# Patient Record
Sex: MALE | Race: WHITE | NOT HISPANIC OR LATINO | URBAN - METROPOLITAN AREA
[De-identification: names, ages, dates, MRNs, and addresses within clinical notes are randomized per-mention and may not be internally consistent; named-entity substitution may affect disease eponyms.]

---

## 2018-12-12 ENCOUNTER — OFFICE (AMBULATORY)
Dept: URBAN - METROPOLITAN AREA PATHOLOGY 4 | Facility: PATHOLOGY | Age: 43
End: 2018-12-12
Payer: COMMERCIAL

## 2018-12-12 ENCOUNTER — ON CAMPUS - OUTPATIENT (AMBULATORY)
Dept: URBAN - METROPOLITAN AREA HOSPITAL 2 | Facility: HOSPITAL | Age: 43
End: 2018-12-12

## 2018-12-12 VITALS
RESPIRATION RATE: 16 BRPM | HEIGHT: 70 IN | DIASTOLIC BLOOD PRESSURE: 78 MMHG | SYSTOLIC BLOOD PRESSURE: 147 MMHG | DIASTOLIC BLOOD PRESSURE: 96 MMHG | HEART RATE: 68 BPM | SYSTOLIC BLOOD PRESSURE: 143 MMHG | DIASTOLIC BLOOD PRESSURE: 84 MMHG | HEART RATE: 67 BPM | OXYGEN SATURATION: 94 % | SYSTOLIC BLOOD PRESSURE: 146 MMHG | SYSTOLIC BLOOD PRESSURE: 132 MMHG | OXYGEN SATURATION: 98 % | TEMPERATURE: 97.5 F | WEIGHT: 246 LBS | DIASTOLIC BLOOD PRESSURE: 104 MMHG | OXYGEN SATURATION: 97 % | OXYGEN SATURATION: 99 % | DIASTOLIC BLOOD PRESSURE: 77 MMHG | HEART RATE: 66 BPM | RESPIRATION RATE: 18 BRPM | OXYGEN SATURATION: 95 % | DIASTOLIC BLOOD PRESSURE: 87 MMHG | HEART RATE: 61 BPM | HEART RATE: 70 BPM | SYSTOLIC BLOOD PRESSURE: 131 MMHG

## 2018-12-12 DIAGNOSIS — K22.2 ESOPHAGEAL OBSTRUCTION: ICD-10-CM

## 2018-12-12 DIAGNOSIS — K29.60 OTHER GASTRITIS WITHOUT BLEEDING: ICD-10-CM

## 2018-12-12 DIAGNOSIS — K29.50 UNSPECIFIED CHRONIC GASTRITIS WITHOUT BLEEDING: ICD-10-CM

## 2018-12-12 DIAGNOSIS — K44.9 DIAPHRAGMATIC HERNIA WITHOUT OBSTRUCTION OR GANGRENE: ICD-10-CM

## 2018-12-12 DIAGNOSIS — R13.10 DYSPHAGIA, UNSPECIFIED: ICD-10-CM

## 2018-12-12 PROBLEM — K31.89 OTHER DISEASES OF STOMACH AND DUODENUM: Status: ACTIVE | Noted: 2018-12-12

## 2018-12-12 PROBLEM — K29.70 GASTRITIS, UNSPECIFIED, WITHOUT BLEEDING: Status: ACTIVE | Noted: 2018-12-12

## 2018-12-12 LAB
GI HISTOLOGY: A. SELECT: (no result)
GI HISTOLOGY: PDF REPORT: (no result)

## 2018-12-12 PROCEDURE — 43450 DILATE ESOPHAGUS 1/MULT PASS: CPT | Performed by: INTERNAL MEDICINE

## 2018-12-12 PROCEDURE — 88305 TISSUE EXAM BY PATHOLOGIST: CPT | Performed by: INTERNAL MEDICINE

## 2018-12-12 PROCEDURE — 43239 EGD BIOPSY SINGLE/MULTIPLE: CPT | Performed by: INTERNAL MEDICINE

## 2022-09-30 ENCOUNTER — OFFICE VISIT (OUTPATIENT)
Dept: CARDIOLOGY | Facility: CLINIC | Age: 47
End: 2022-09-30

## 2022-09-30 VITALS
HEIGHT: 70 IN | DIASTOLIC BLOOD PRESSURE: 92 MMHG | HEART RATE: 65 BPM | BODY MASS INDEX: 37.94 KG/M2 | SYSTOLIC BLOOD PRESSURE: 148 MMHG | WEIGHT: 265 LBS | OXYGEN SATURATION: 96 %

## 2022-09-30 DIAGNOSIS — R06.09 DYSPNEA ON EXERTION: Primary | ICD-10-CM

## 2022-09-30 DIAGNOSIS — R07.9 CHEST PAIN, UNSPECIFIED TYPE: ICD-10-CM

## 2022-09-30 DIAGNOSIS — R00.2 PALPITATIONS: ICD-10-CM

## 2022-09-30 PROCEDURE — 99204 OFFICE O/P NEW MOD 45 MIN: CPT | Performed by: INTERNAL MEDICINE

## 2022-09-30 PROCEDURE — 93000 ELECTROCARDIOGRAM COMPLETE: CPT | Performed by: INTERNAL MEDICINE

## 2022-09-30 RX ORDER — OMEPRAZOLE 20 MG/1
CAPSULE, DELAYED RELEASE ORAL
COMMUNITY
Start: 2016-06-13 | End: 2022-12-21

## 2022-09-30 RX ORDER — IBUPROFEN 800 MG/1
TABLET ORAL
COMMUNITY
Start: 2014-12-10 | End: 2022-09-30

## 2022-10-27 ENCOUNTER — HOSPITAL ENCOUNTER (OUTPATIENT)
Dept: CARDIOLOGY | Facility: HOSPITAL | Age: 47
Discharge: HOME | End: 2022-10-27

## 2022-10-27 VITALS
WEIGHT: 265 LBS | BODY MASS INDEX: 37.94 KG/M2 | SYSTOLIC BLOOD PRESSURE: 142 MMHG | DIASTOLIC BLOOD PRESSURE: 86 MMHG | HEIGHT: 70 IN | HEART RATE: 56 BPM

## 2022-10-27 DIAGNOSIS — R06.09 DYSPNEA ON EXERTION: ICD-10-CM

## 2022-10-27 DIAGNOSIS — R00.2 PALPITATIONS: ICD-10-CM

## 2022-10-27 DIAGNOSIS — R07.9 CHEST PAIN, UNSPECIFIED TYPE: ICD-10-CM

## 2022-10-27 PROCEDURE — 93017 CV STRESS TEST TRACING ONLY: CPT

## 2022-10-27 PROCEDURE — A9500 TC99M SESTAMIBI: HCPCS | Performed by: INTERNAL MEDICINE

## 2022-10-27 PROCEDURE — 0 TECHNETIUM SESTAMIBI: Performed by: INTERNAL MEDICINE

## 2022-10-27 PROCEDURE — 78452 HT MUSCLE IMAGE SPECT MULT: CPT

## 2022-10-27 PROCEDURE — 78452 HT MUSCLE IMAGE SPECT MULT: CPT | Performed by: INTERNAL MEDICINE

## 2022-10-27 PROCEDURE — 93018 CV STRESS TEST I&R ONLY: CPT | Performed by: INTERNAL MEDICINE

## 2022-10-27 PROCEDURE — 93016 CV STRESS TEST SUPVJ ONLY: CPT | Performed by: INTERNAL MEDICINE

## 2022-10-27 PROCEDURE — 93306 TTE W/DOPPLER COMPLETE: CPT

## 2022-10-27 PROCEDURE — 93306 TTE W/DOPPLER COMPLETE: CPT | Performed by: INTERNAL MEDICINE

## 2022-10-27 RX ADMIN — TECHNETIUM TC 99M SESTAMIBI 1 DOSE: 1 INJECTION INTRAVENOUS at 09:18

## 2022-10-27 RX ADMIN — TECHNETIUM TC 99M SESTAMIBI 1 DOSE: 1 INJECTION INTRAVENOUS at 11:16

## 2022-11-05 LAB
AORTIC ARCH: 2.8 CM
ASCENDING AORTA: 3.1 CM
BH CV ECHO MEAS - ACS: 2.5 CM
BH CV ECHO MEAS - AO MAX PG: 7.6 MMHG
BH CV ECHO MEAS - AO MEAN PG: 3.6 MMHG
BH CV ECHO MEAS - AO ROOT DIAM: 3.3 CM
BH CV ECHO MEAS - AO V2 MAX: 137.4 CM/SEC
BH CV ECHO MEAS - AO V2 VTI: 29.4 CM
BH CV ECHO MEAS - AVA(I,D): 3.9 CM2
BH CV ECHO MEAS - EDV(CUBED): 182.2 ML
BH CV ECHO MEAS - EDV(MOD-SP2): 139.1 ML
BH CV ECHO MEAS - EDV(MOD-SP4): 147.4 ML
BH CV ECHO MEAS - EF(MOD-BP): 55 %
BH CV ECHO MEAS - EF(MOD-SP2): 58.9 %
BH CV ECHO MEAS - EF(MOD-SP4): 54.2 %
BH CV ECHO MEAS - ESV(CUBED): 66 ML
BH CV ECHO MEAS - ESV(MOD-SP2): 57.2 ML
BH CV ECHO MEAS - ESV(MOD-SP4): 67.5 ML
BH CV ECHO MEAS - FS: 28.7 %
BH CV ECHO MEAS - IVS/LVPW: 1.21 CM
BH CV ECHO MEAS - IVSD: 1.2 CM
BH CV ECHO MEAS - LA A2CS (ATRIAL LENGTH): 5.8 CM
BH CV ECHO MEAS - LA A4C LENGTH: 5.9 CM
BH CV ECHO MEAS - LA DIMENSION: 4.8 CM
BH CV ECHO MEAS - LAT PEAK E' VEL: 14 CM/SEC
BH CV ECHO MEAS - LV DIASTOLIC VOL/BSA (35-75): 62.6 CM2
BH CV ECHO MEAS - LV MASS(C)D: 253.6 GRAMS
BH CV ECHO MEAS - LV MAX PG: 3.8 MMHG
BH CV ECHO MEAS - LV MEAN PG: 2.09 MMHG
BH CV ECHO MEAS - LV SYSTOLIC VOL/BSA (12-30): 28.7 CM2
BH CV ECHO MEAS - LV V1 MAX: 97.2 CM/SEC
BH CV ECHO MEAS - LV V1 VTI: 23.3 CM
BH CV ECHO MEAS - LVIDD: 5.7 CM
BH CV ECHO MEAS - LVIDS: 4 CM
BH CV ECHO MEAS - LVOT AREA: 4.9 CM2
BH CV ECHO MEAS - LVOT DIAM: 2.5 CM
BH CV ECHO MEAS - LVPWD: 0.99 CM
BH CV ECHO MEAS - MED PEAK E' VEL: 10 CM/SEC
BH CV ECHO MEAS - MR MAX PG: 104.2 MMHG
BH CV ECHO MEAS - MR MAX VEL: 510.3 CM/SEC
BH CV ECHO MEAS - MV A MAX VEL: 46.2 CM/SEC
BH CV ECHO MEAS - MV DEC SLOPE: 514.7 CM/SEC2
BH CV ECHO MEAS - MV DEC TIME: 0.18 MSEC
BH CV ECHO MEAS - MV E MAX VEL: 90.6 CM/SEC
BH CV ECHO MEAS - MV E/A: 1.96
BH CV ECHO MEAS - MV MAX PG: 3.6 MMHG
BH CV ECHO MEAS - MV MEAN PG: 0.57 MMHG
BH CV ECHO MEAS - MV P1/2T: 51 MSEC
BH CV ECHO MEAS - MV V2 VTI: 25.1 CM
BH CV ECHO MEAS - MVA(P1/2T): 4.3 CM2
BH CV ECHO MEAS - MVA(VTI): 4.6 CM2
BH CV ECHO MEAS - PA ACC SLOPE: 467 CM/SEC2
BH CV ECHO MEAS - PA ACC TIME: 0.14 SEC
BH CV ECHO MEAS - PA PR(ACCEL): 16.6 MMHG
BH CV ECHO MEAS - PA V2 MAX: 104.4 CM/SEC
BH CV ECHO MEAS - PAPD(PI EDV): 5 MMHG
BH CV ECHO MEAS - PI END-D VEL: 60.8 CM/SEC
BH CV ECHO MEAS - PULM A REVS DUR: 0.13 SEC
BH CV ECHO MEAS - PULM A REVS VEL: 20.6 CM/SEC
BH CV ECHO MEAS - PULM DIAS VEL: 61.3 CM/SEC
BH CV ECHO MEAS - PULM S/D: 1.09
BH CV ECHO MEAS - PULM SYS VEL: 66.6 CM/SEC
BH CV ECHO MEAS - RAP SYSTOLE: 3 MMHG
BH CV ECHO MEAS - RV MAX PG: 1.77 MMHG
BH CV ECHO MEAS - RV V1 MAX: 66.6 CM/SEC
BH CV ECHO MEAS - RV V1 VTI: 18 CM
BH CV ECHO MEAS - RVSP: 32.3 MMHG
BH CV ECHO MEAS - SI(MOD-SP2): 34.8 ML/M2
BH CV ECHO MEAS - SI(MOD-SP4): 33.9 ML/M2
BH CV ECHO MEAS - SUP REN AO DIAM: 2.5 CM
BH CV ECHO MEAS - SV(LVOT): 114.8 ML
BH CV ECHO MEAS - SV(MOD-SP2): 81.9 ML
BH CV ECHO MEAS - SV(MOD-SP4): 79.8 ML
BH CV ECHO MEAS - TAPSE (>1.6): 1.9 CM
BH CV ECHO MEAS - TR MAX PG: 29.3 MMHG
BH CV ECHO MEAS - TR MAX VEL: 270.6 CM/SEC
BH CV ECHO MEASUREMENTS AVERAGE E/E' RATIO: 7.55
BH CV VAS BP LEFT ARM: NORMAL MMHG
BH CV XLRA - RV BASE: 4 CM
BH CV XLRA - RV MID: 3.7 CM
BH CV XLRA - TDI S': 11 CM/SEC
IVRT: 88 MSEC
LEFT ATRIUM VOLUME INDEX: 37 ML/M2
LEFT ATRIUM VOLUME: 37 ML
MAXIMAL PREDICTED HEART RATE: 171 BPM
STRESS TARGET HR: 145 BPM

## 2022-11-09 LAB
BH CV REST NUCLEAR ISOTOPE DOSE: 11.8 MCI
BH CV STRESS BP STAGE 1: NORMAL
BH CV STRESS BP STAGE 2: NORMAL
BH CV STRESS DURATION MIN STAGE 1: 3
BH CV STRESS DURATION MIN STAGE 2: 3
BH CV STRESS DURATION MIN STAGE 3: 3
BH CV STRESS DURATION MIN STAGE 4: 1
BH CV STRESS DURATION SEC STAGE 1: 0
BH CV STRESS DURATION SEC STAGE 2: 0
BH CV STRESS DURATION SEC STAGE 3: 0
BH CV STRESS DURATION SEC STAGE 4: 30
BH CV STRESS GRADE STAGE 1: 10
BH CV STRESS GRADE STAGE 2: 12
BH CV STRESS GRADE STAGE 3: 14
BH CV STRESS GRADE STAGE 4: 16
BH CV STRESS HR STAGE 1: 93
BH CV STRESS HR STAGE 2: 120
BH CV STRESS HR STAGE 3: 142
BH CV STRESS HR STAGE 4: 162
BH CV STRESS METS STAGE 1: 5
BH CV STRESS METS STAGE 2: 7.5
BH CV STRESS METS STAGE 3: 10
BH CV STRESS METS STAGE 4: 13.5
BH CV STRESS NUCLEAR ISOTOPE DOSE: 34 MCI
BH CV STRESS PROTOCOL 1: NORMAL
BH CV STRESS RECOVERY BP: NORMAL MMHG
BH CV STRESS RECOVERY HR: 93 BPM
BH CV STRESS SPEED STAGE 1: 1.7
BH CV STRESS SPEED STAGE 2: 2.5
BH CV STRESS SPEED STAGE 3: 3.4
BH CV STRESS SPEED STAGE 4: 4.2
BH CV STRESS STAGE 1: 1
BH CV STRESS STAGE 2: 2
BH CV STRESS STAGE 3: 3
BH CV STRESS STAGE 4: 4
LV EF NUC BP: 55 %
MAXIMAL PREDICTED HEART RATE: 171 BPM
PERCENT MAX PREDICTED HR: 95.91 %
STRESS BASELINE BP: NORMAL MMHG
STRESS BASELINE HR: 49 BPM
STRESS PERCENT HR: 113 %
STRESS POST ESTIMATED WORKLOAD: 13.4 METS
STRESS POST EXERCISE DUR MIN: 10 MIN
STRESS POST EXERCISE DUR SEC: 15 SEC
STRESS POST PEAK BP: NORMAL MMHG
STRESS POST PEAK HR: 164 BPM
STRESS TARGET HR: 145 BPM

## 2022-12-21 ENCOUNTER — OFFICE VISIT (OUTPATIENT)
Dept: CARDIOLOGY | Facility: CLINIC | Age: 47
End: 2022-12-21
Payer: COMMERCIAL

## 2022-12-21 VITALS
WEIGHT: 267 LBS | DIASTOLIC BLOOD PRESSURE: 85 MMHG | HEART RATE: 51 BPM | BODY MASS INDEX: 38.22 KG/M2 | HEIGHT: 70 IN | SYSTOLIC BLOOD PRESSURE: 125 MMHG | OXYGEN SATURATION: 97 %

## 2022-12-21 DIAGNOSIS — R00.2 PALPITATIONS: Primary | ICD-10-CM

## 2022-12-21 PROCEDURE — 99213 OFFICE O/P EST LOW 20 MIN: CPT | Performed by: INTERNAL MEDICINE

## 2022-12-21 RX ORDER — OMEPRAZOLE 40 MG/1
40 CAPSULE, DELAYED RELEASE ORAL DAILY
COMMUNITY
Start: 2022-10-13

## 2022-12-21 RX ORDER — METOPROLOL SUCCINATE 25 MG/1
25 TABLET, EXTENDED RELEASE ORAL DAILY
COMMUNITY
Start: 2022-11-19

## 2022-12-21 RX ORDER — MAGNESIUM GLUCONATE 27 MG(500)
27 TABLET ORAL DAILY
COMMUNITY

## 2022-12-21 RX ORDER — MULTIPLE VITAMINS W/ MINERALS TAB 9MG-400MCG
1 TAB ORAL DAILY
COMMUNITY

## 2022-12-21 NOTE — PROGRESS NOTES
Cardiology Office Visit      Encounter Date:  12/21/2022    Patient ID:   Guille Thompson is a 47 y.o. male.    Reason For Followup:  Palpitations  Chest discomfort    Brief Clinical History:  Dear Dr. Hyde, Nicolle Pettit MD    I had the pleasure of seeing Guille Thompson today. As you are well aware, this is a 47 y.o. male  with no established history of ischemic heart disease.  He does have a history of hypertension and acid reflux.  He presents today for the follow-up evaluation of chest discomfort.    Interval History:  He reports that he has had chest discomfort for quite some time.  He has had stress test and EKGs in the long past and was told that his discomfort was musculoskeletal.  He does carry some concern however because he does have a significant family history of heart disease.  His father and uncles all suffer from coronary occlusive disease and cardiac conditions.  His chest discomfort has been persistent for 7 to 8 years.  His last stress test was about 5 years ago at [facility].  The discomfort appears to happen spontaneously.  It can be a 6-7 out of 10 in intensity.  He can have some soreness in his left arm and he can have a headache.  He denies any shortness of breath.  He denies any PND orthopnea.  He denies any syncope or near syncope.  He does report palpitations.  There does not appear to be a relationship between the palpitations and chest discomfort.    He is currently in the process of a work-up with a rheumatologist for her musculoskeletal issues and arthritis.  There was some concern that he may have had lupus however it appears that his test were inaccurate and subsequent testing has failed to demonstrate any abnormalities.    We were able to locate his most recent stress echocardiogram from August 2015.  Isolated PACs and PVCs were noted with stress.  Hyperdynamic wall motion was noted in all segments and post stress EF exceeded 70%.  No ST segment depression was noted.  A hypertensive  response to exercise was noted.    Since the patient's last visit.,  He has acquired a cardia mobile device and has been checking his rhythm.  He reports that he is had no further palpitations.  No abnormalities of been reported on his cardia mobile device.  We reviewed some of the rhythms today.    The patient underwent a nuclear stress test and 2D echocardiogram in November 2022.  Nuclear stress test was negative for ischemia and demonstrative of a normal left ventricular systolic ejection fraction of 55%.  2D echocardiogram demonstrated normal left ventricular systolic function and EF of 55 to 60%.    Assessment & Plan    Impressions:  Chest pain with typical and atypical features  Palpitations  Hypertension  Musculoskeletal issues and potential arthritis  Sleep apnea  Acid reflux    Recommendations:  Continuation of his current cardiovascular regimen at the present time.     This includes metoprolol  Follow-up as needed      Diagnoses and all orders for this visit:    1. Palpitations (Primary)          Objective:    Vitals:  Vitals:    12/21/22 0921   BP: 125/85   Pulse: 51   SpO2: 97%   Weight: 121 kg (267 lb)   Height: 177.8 cm (70\")     Body mass index is 38.31 kg/m².      Physical Exam:    General: Alert, cooperative, no distress, appears stated age  Head:  Normocephalic, atraumatic, mucous membranes moist  Eyes:  Conjunctiva/corneas clear, EOM's intact     Neck:  Supple,  no bruit    Lungs: Clear to auscultation bilaterally, no wheezes rhonchi rales are noted  Chest wall: No tenderness  Heart::  Regular rate and rhythm, S1 and S2 normal, 1/6 holosystolic murmur.  No rub or gallop  Abdomen: Soft, non-tender, nondistended bowel sounds active  Extremities: No cyanosis, clubbing, or edema  Pulses: 2+ and symmetric all extremities  Skin:  No rashes or lesions  Neuro/psych: A&O x3. CN II through XII are grossly intact with appropriate affect      Allergies:  Allergies   Allergen Reactions   • Penicillins Rash        Medication Review:     Current Outpatient Medications:   •  magnesium gluconate (MAGONATE) 500 MG tablet, Take 27 mg by mouth Daily., Disp: , Rfl:   •  metoprolol succinate XL (TOPROL-XL) 25 MG 24 hr tablet, Take 25 mg by mouth Daily., Disp: , Rfl:   •  multivitamin with minerals tablet tablet, Take 1 tablet by mouth Daily., Disp: , Rfl:   •  omeprazole (priLOSEC) 40 MG capsule, Take 40 mg by mouth Daily., Disp: , Rfl:   •  vitamin D3 125 MCG (5000 UT) capsule capsule, Take 5,000 Units by mouth 2 (Two) Times a Day., Disp: , Rfl:     Family History:  Family History   Problem Relation Age of Onset   • Heart disease Father        Past Medical History:  Past Medical History:   Diagnosis Date   • Hypertension    • Sleep apnea        Past Surgical History:  Past Surgical History:   Procedure Laterality Date   • APPENDECTOMY     • LASIK         Social History:  Social History     Socioeconomic History   • Marital status:    Tobacco Use   • Smoking status: Never   • Smokeless tobacco: Never   Vaping Use   • Vaping Use: Never used   Substance and Sexual Activity   • Alcohol use: Yes     Comment: social   • Drug use: Never       Review of Systems:  The following systems were reviewed as they relate to the cardiovascular system: Constitutional, Eyes, ENT, Cardiovascular, Respiratory, Gastrointestinal, Integumentary, Neurological, Psychiatric, Hematologic, Endocrine, Musculoskeletal, and Genitourinary. The pertinent cardiovascular findings are reported above with all other cardiovascular points within those systems being negative.    Diagnostic Study Review:     Current Electrocardiogram:  Procedures no new EKG.  EKG dated 9/30/2022 demonstrates sinus rhythm with a ventricular rate of 58 bpm.    Laboratory Data:  No results found for: GLUCOSE, BUN, CREATININE, EGFRIFNONA, EGFRIFAFRI, BCR, K, CO2, CALCIUM, PROTENTOTREF, ALBUMIN, LABIL2, BILIRUBIN, AST, ALT  No results found for: GLUCOSE, CALCIUM, NA, K, CO2, CL,  BUN, CREATININE, EGFRIFAFRI, EGFRIFNONA, BCR, ANIONGAP  No results found for: WBC, HGB, HCT, MCV, PLT  No results found for: CHOL, CHLPL, TRIG, HDL, LDL, LDLDIRECT  No results found for: HGBA1C  No results found for: INR, PROTIME    Most Recent Echo:  Results for orders placed during the hospital encounter of 10/27/22    Adult Transthoracic Echo Complete W/ Cont if Necessary Per Protocol    Interpretation Summary  •  Left ventricular systolic function is normal. Left ventricular ejection fraction appears to be 56 - 60%.  •  The left ventricular cavity is borderline dilated.  •  Estimated right ventricular systolic pressure from tricuspid regurgitation is normal (<35 mmHg).       Most Recent Stress Test:  Results for orders placed during the hospital encounter of 10/27/22    Stress Test With Myocardial Perfusion One Day    Interpretation Summary  •  Myocardial perfusion imaging indicates a normal myocardial perfusion study with no evidence of ischemia.  •  Left ventricular ejection fraction is normal (Calculated EF = 55%).  •  Low risk for ischemic heart disease.  •  Impressions are consistent with a low risk study.  •  There is no prior study available for comparison.  •  Findings consistent with a normal ECG stress test.       Most Recent Cardiac Catheterization:   No results found for this or any previous visit.       NOTE: The following portions of the patient's note were reviewed, confirmed and/or updated this visit as appropriate: History of present illness/Interval history, physical examination, assessment & plan, allergies, current medications, past family history, past medical history, past social history, past surgical history and problem list.

## 2023-01-09 PROBLEM — R00.2 PALPITATIONS: Status: ACTIVE | Noted: 2023-01-09

## 2024-12-10 ENCOUNTER — OFFICE (AMBULATORY)
Dept: URBAN - METROPOLITAN AREA CLINIC 64 | Facility: CLINIC | Age: 49
End: 2024-12-10

## 2024-12-10 VITALS
DIASTOLIC BLOOD PRESSURE: 98 MMHG | HEART RATE: 64 BPM | WEIGHT: 283 LBS | HEIGHT: 70 IN | SYSTOLIC BLOOD PRESSURE: 143 MMHG

## 2024-12-10 DIAGNOSIS — R19.4 CHANGE IN BOWEL HABIT: ICD-10-CM

## 2024-12-10 DIAGNOSIS — K21.9 GASTRO-ESOPHAGEAL REFLUX DISEASE WITHOUT ESOPHAGITIS: ICD-10-CM

## 2024-12-10 PROCEDURE — 99204 OFFICE O/P NEW MOD 45 MIN: CPT | Performed by: INTERNAL MEDICINE

## 2024-12-15 ENCOUNTER — APPOINTMENT (OUTPATIENT)
Dept: CT IMAGING | Facility: HOSPITAL | Age: 49
End: 2024-12-15
Payer: COMMERCIAL

## 2024-12-15 ENCOUNTER — HOSPITAL ENCOUNTER (INPATIENT)
Facility: HOSPITAL | Age: 49
LOS: 3 days | Discharge: HOME | End: 2024-12-18
Attending: INTERNAL MEDICINE | Admitting: INTERNAL MEDICINE
Payer: COMMERCIAL

## 2024-12-15 DIAGNOSIS — R11.0 NAUSEA: ICD-10-CM

## 2024-12-15 DIAGNOSIS — K85.90 ACUTE PANCREATITIS WITHOUT INFECTION OR NECROSIS, UNSPECIFIED PANCREATITIS TYPE: ICD-10-CM

## 2024-12-15 DIAGNOSIS — K85.30 DRUG-INDUCED ACUTE PANCREATITIS WITHOUT INFECTION OR NECROSIS: ICD-10-CM

## 2024-12-15 DIAGNOSIS — R10.13 EPIGASTRIC ABDOMINAL PAIN: Primary | ICD-10-CM

## 2024-12-15 LAB
ALBUMIN SERPL-MCNC: 3.9 G/DL (ref 3.5–5.2)
ALBUMIN/GLOB SERPL: 1.3 G/DL
ALP SERPL-CCNC: 73 U/L (ref 39–117)
ALT SERPL W P-5'-P-CCNC: 16 U/L (ref 1–41)
ANION GAP SERPL CALCULATED.3IONS-SCNC: 10.2 MMOL/L (ref 5–15)
AST SERPL-CCNC: 24 U/L (ref 1–40)
BASOPHILS # BLD AUTO: 0.04 10*3/MM3 (ref 0–0.2)
BASOPHILS NFR BLD AUTO: 0.3 % (ref 0–1.5)
BILIRUB SERPL-MCNC: 0.6 MG/DL (ref 0–1.2)
BILIRUB UR QL STRIP: NEGATIVE
BUN SERPL-MCNC: 9 MG/DL (ref 6–20)
BUN/CREAT SERPL: 9.6 (ref 7–25)
CALCIUM SPEC-SCNC: 9.2 MG/DL (ref 8.6–10.5)
CHLORIDE SERPL-SCNC: 103 MMOL/L (ref 98–107)
CLARITY UR: CLEAR
CO2 SERPL-SCNC: 24.8 MMOL/L (ref 22–29)
COLOR UR: YELLOW
CREAT SERPL-MCNC: 0.94 MG/DL (ref 0.76–1.27)
DEPRECATED RDW RBC AUTO: 44.2 FL (ref 37–54)
EGFRCR SERPLBLD CKD-EPI 2021: 98.1 ML/MIN/1.73
EOSINOPHIL # BLD AUTO: 0.08 10*3/MM3 (ref 0–0.4)
EOSINOPHIL NFR BLD AUTO: 0.7 % (ref 0.3–6.2)
ERYTHROCYTE [DISTWIDTH] IN BLOOD BY AUTOMATED COUNT: 13.2 % (ref 12.3–15.4)
GLOBULIN UR ELPH-MCNC: 3.1 GM/DL
GLUCOSE SERPL-MCNC: 106 MG/DL (ref 65–99)
GLUCOSE UR STRIP-MCNC: NEGATIVE MG/DL
HCT VFR BLD AUTO: 45.9 % (ref 37.5–51)
HGB BLD-MCNC: 14.4 G/DL (ref 13–17.7)
HGB UR QL STRIP.AUTO: NEGATIVE
HOLD SPECIMEN: NORMAL
IMM GRANULOCYTES # BLD AUTO: 0.03 10*3/MM3 (ref 0–0.05)
IMM GRANULOCYTES NFR BLD AUTO: 0.3 % (ref 0–0.5)
KETONES UR QL STRIP: NEGATIVE
LEUKOCYTE ESTERASE UR QL STRIP.AUTO: NEGATIVE
LIPASE SERPL-CCNC: 83 U/L (ref 13–60)
LYMPHOCYTES # BLD AUTO: 1.47 10*3/MM3 (ref 0.7–3.1)
LYMPHOCYTES NFR BLD AUTO: 12.3 % (ref 19.6–45.3)
MCH RBC QN AUTO: 28.3 PG (ref 26.6–33)
MCHC RBC AUTO-ENTMCNC: 31.4 G/DL (ref 31.5–35.7)
MCV RBC AUTO: 90.4 FL (ref 79–97)
MONOCYTES # BLD AUTO: 1.1 10*3/MM3 (ref 0.1–0.9)
MONOCYTES NFR BLD AUTO: 9.2 % (ref 5–12)
NEUTROPHILS NFR BLD AUTO: 77.2 % (ref 42.7–76)
NEUTROPHILS NFR BLD AUTO: 9.21 10*3/MM3 (ref 1.7–7)
NITRITE UR QL STRIP: NEGATIVE
NRBC BLD AUTO-RTO: 0 /100 WBC (ref 0–0.2)
PH UR STRIP.AUTO: 6.5 [PH] (ref 5–8)
PLATELET # BLD AUTO: 200 10*3/MM3 (ref 140–450)
PMV BLD AUTO: 10.3 FL (ref 6–12)
POTASSIUM SERPL-SCNC: 3.9 MMOL/L (ref 3.5–5.2)
PROT SERPL-MCNC: 7 G/DL (ref 6–8.5)
PROT UR QL STRIP: NEGATIVE
RBC # BLD AUTO: 5.08 10*6/MM3 (ref 4.14–5.8)
SODIUM SERPL-SCNC: 138 MMOL/L (ref 136–145)
SP GR UR STRIP: 1.02 (ref 1–1.03)
UROBILINOGEN UR QL STRIP: NORMAL
WBC NRBC COR # BLD AUTO: 11.93 10*3/MM3 (ref 3.4–10.8)

## 2024-12-15 PROCEDURE — 85025 COMPLETE CBC W/AUTO DIFF WBC: CPT

## 2024-12-15 PROCEDURE — 25810000003 SODIUM CHLORIDE 0.9 % SOLUTION

## 2024-12-15 PROCEDURE — 81003 URINALYSIS AUTO W/O SCOPE: CPT

## 2024-12-15 PROCEDURE — 25510000001 IOPAMIDOL PER 1 ML

## 2024-12-15 PROCEDURE — 99285 EMERGENCY DEPT VISIT HI MDM: CPT

## 2024-12-15 PROCEDURE — 74177 CT ABD & PELVIS W/CONTRAST: CPT

## 2024-12-15 PROCEDURE — 25010000002 ONDANSETRON PER 1 MG

## 2024-12-15 PROCEDURE — 80053 COMPREHEN METABOLIC PANEL: CPT

## 2024-12-15 PROCEDURE — 25010000002 PROCHLORPERAZINE 10 MG/2ML SOLUTION

## 2024-12-15 PROCEDURE — 25010000002 KETOROLAC TROMETHAMINE PER 15 MG: Performed by: FAMILY MEDICINE

## 2024-12-15 PROCEDURE — 63710000001 ONDANSETRON ODT 4 MG TABLET DISPERSIBLE

## 2024-12-15 PROCEDURE — 93010 ELECTROCARDIOGRAM REPORT: CPT | Performed by: INTERNAL MEDICINE

## 2024-12-15 PROCEDURE — 93005 ELECTROCARDIOGRAM TRACING: CPT

## 2024-12-15 PROCEDURE — 25010000002 ENOXAPARIN PER 10 MG

## 2024-12-15 PROCEDURE — 25010000002 HYDROMORPHONE PER 4 MG

## 2024-12-15 PROCEDURE — 83690 ASSAY OF LIPASE: CPT

## 2024-12-15 PROCEDURE — 25010000002 KETOROLAC TROMETHAMINE PER 15 MG

## 2024-12-15 PROCEDURE — 36415 COLL VENOUS BLD VENIPUNCTURE: CPT

## 2024-12-15 RX ORDER — ENOXAPARIN SODIUM 100 MG/ML
40 INJECTION SUBCUTANEOUS EVERY 12 HOURS
Status: DISCONTINUED | OUTPATIENT
Start: 2024-12-15 | End: 2024-12-18 | Stop reason: HOSPADM

## 2024-12-15 RX ORDER — KETOROLAC TROMETHAMINE 30 MG/ML
15 INJECTION, SOLUTION INTRAMUSCULAR; INTRAVENOUS ONCE
Status: COMPLETED | OUTPATIENT
Start: 2024-12-15 | End: 2024-12-15

## 2024-12-15 RX ORDER — HYDRALAZINE HYDROCHLORIDE 20 MG/ML
10 INJECTION INTRAMUSCULAR; INTRAVENOUS EVERY 6 HOURS PRN
Status: DISCONTINUED | OUTPATIENT
Start: 2024-12-15 | End: 2024-12-18 | Stop reason: HOSPADM

## 2024-12-15 RX ORDER — BISACODYL 5 MG/1
5 TABLET, DELAYED RELEASE ORAL DAILY PRN
Status: DISCONTINUED | OUTPATIENT
Start: 2024-12-15 | End: 2024-12-18 | Stop reason: HOSPADM

## 2024-12-15 RX ORDER — ONDANSETRON 4 MG/1
4 TABLET, ORALLY DISINTEGRATING ORAL ONCE
Status: COMPLETED | OUTPATIENT
Start: 2024-12-15 | End: 2024-12-15

## 2024-12-15 RX ORDER — IOPAMIDOL 755 MG/ML
100 INJECTION, SOLUTION INTRAVASCULAR
Status: COMPLETED | OUTPATIENT
Start: 2024-12-15 | End: 2024-12-15

## 2024-12-15 RX ORDER — DICYCLOMINE HYDROCHLORIDE 10 MG/1
20 CAPSULE ORAL ONCE
Status: COMPLETED | OUTPATIENT
Start: 2024-12-15 | End: 2024-12-15

## 2024-12-15 RX ORDER — ONDANSETRON 4 MG/1
4 TABLET, ORALLY DISINTEGRATING ORAL EVERY 6 HOURS PRN
Status: DISCONTINUED | OUTPATIENT
Start: 2024-12-15 | End: 2024-12-18 | Stop reason: HOSPADM

## 2024-12-15 RX ORDER — SODIUM CHLORIDE 0.9 % (FLUSH) 0.9 %
10 SYRINGE (ML) INJECTION EVERY 12 HOURS SCHEDULED
Status: DISCONTINUED | OUTPATIENT
Start: 2024-12-15 | End: 2024-12-18 | Stop reason: HOSPADM

## 2024-12-15 RX ORDER — NITROGLYCERIN 0.4 MG/1
0.4 TABLET SUBLINGUAL
Status: DISCONTINUED | OUTPATIENT
Start: 2024-12-15 | End: 2024-12-18 | Stop reason: HOSPADM

## 2024-12-15 RX ORDER — SODIUM CHLORIDE 9 MG/ML
125 INJECTION, SOLUTION INTRAVENOUS CONTINUOUS
Status: DISCONTINUED | OUTPATIENT
Start: 2024-12-15 | End: 2024-12-16

## 2024-12-15 RX ORDER — HYDRALAZINE HYDROCHLORIDE 20 MG/ML
20 INJECTION INTRAMUSCULAR; INTRAVENOUS EVERY 6 HOURS PRN
Status: DISCONTINUED | OUTPATIENT
Start: 2024-12-15 | End: 2024-12-15

## 2024-12-15 RX ORDER — BISACODYL 10 MG
10 SUPPOSITORY, RECTAL RECTAL DAILY PRN
Status: DISCONTINUED | OUTPATIENT
Start: 2024-12-15 | End: 2024-12-18 | Stop reason: HOSPADM

## 2024-12-15 RX ORDER — ONDANSETRON 2 MG/ML
4 INJECTION INTRAMUSCULAR; INTRAVENOUS EVERY 6 HOURS PRN
Status: DISCONTINUED | OUTPATIENT
Start: 2024-12-15 | End: 2024-12-18 | Stop reason: HOSPADM

## 2024-12-15 RX ORDER — PANTOPRAZOLE SODIUM 40 MG/10ML
40 INJECTION, POWDER, LYOPHILIZED, FOR SOLUTION INTRAVENOUS DAILY
Status: DISCONTINUED | OUTPATIENT
Start: 2024-12-15 | End: 2024-12-18 | Stop reason: HOSPADM

## 2024-12-15 RX ORDER — SODIUM CHLORIDE 0.9 % (FLUSH) 0.9 %
10 SYRINGE (ML) INJECTION AS NEEDED
Status: DISCONTINUED | OUTPATIENT
Start: 2024-12-15 | End: 2024-12-18 | Stop reason: HOSPADM

## 2024-12-15 RX ORDER — POLYETHYLENE GLYCOL 3350 17 G/17G
17 POWDER, FOR SOLUTION ORAL DAILY PRN
Status: DISCONTINUED | OUTPATIENT
Start: 2024-12-15 | End: 2024-12-18 | Stop reason: HOSPADM

## 2024-12-15 RX ORDER — HYDROMORPHONE HYDROCHLORIDE 1 MG/ML
0.5 INJECTION, SOLUTION INTRAMUSCULAR; INTRAVENOUS; SUBCUTANEOUS
Status: DISCONTINUED | OUTPATIENT
Start: 2024-12-15 | End: 2024-12-16

## 2024-12-15 RX ORDER — PROCHLORPERAZINE EDISYLATE 5 MG/ML
10 INJECTION INTRAMUSCULAR; INTRAVENOUS ONCE
Status: COMPLETED | OUTPATIENT
Start: 2024-12-15 | End: 2024-12-15

## 2024-12-15 RX ORDER — AMOXICILLIN 250 MG
2 CAPSULE ORAL 2 TIMES DAILY PRN
Status: DISCONTINUED | OUTPATIENT
Start: 2024-12-15 | End: 2024-12-18 | Stop reason: HOSPADM

## 2024-12-15 RX ORDER — ENOXAPARIN SODIUM 100 MG/ML
40 INJECTION SUBCUTANEOUS NIGHTLY
Status: DISCONTINUED | OUTPATIENT
Start: 2024-12-15 | End: 2024-12-15

## 2024-12-15 RX ORDER — SODIUM CHLORIDE 9 MG/ML
40 INJECTION, SOLUTION INTRAVENOUS AS NEEDED
Status: DISCONTINUED | OUTPATIENT
Start: 2024-12-15 | End: 2024-12-18 | Stop reason: HOSPADM

## 2024-12-15 RX ADMIN — ONDANSETRON 4 MG: 2 INJECTION INTRAMUSCULAR; INTRAVENOUS at 16:37

## 2024-12-15 RX ADMIN — ENOXAPARIN SODIUM 40 MG: 100 INJECTION SUBCUTANEOUS at 20:46

## 2024-12-15 RX ADMIN — PROCHLORPERAZINE EDISYLATE 10 MG: 5 INJECTION INTRAMUSCULAR; INTRAVENOUS at 11:46

## 2024-12-15 RX ADMIN — HYDROMORPHONE HYDROCHLORIDE 0.5 MG: 1 INJECTION, SOLUTION INTRAMUSCULAR; INTRAVENOUS; SUBCUTANEOUS at 16:38

## 2024-12-15 RX ADMIN — KETOROLAC TROMETHAMINE 15 MG: 30 INJECTION, SOLUTION INTRAMUSCULAR; INTRAVENOUS at 21:33

## 2024-12-15 RX ADMIN — Medication 10 ML: at 20:47

## 2024-12-15 RX ADMIN — IOPAMIDOL 100 ML: 755 INJECTION, SOLUTION INTRAVENOUS at 11:32

## 2024-12-15 RX ADMIN — DICYCLOMINE HYDROCHLORIDE 20 MG: 10 CAPSULE ORAL at 10:10

## 2024-12-15 RX ADMIN — ONDANSETRON 4 MG: 4 TABLET, ORALLY DISINTEGRATING ORAL at 10:10

## 2024-12-15 RX ADMIN — Medication 10 ML: at 15:17

## 2024-12-15 RX ADMIN — HYDROMORPHONE HYDROCHLORIDE 0.5 MG: 1 INJECTION, SOLUTION INTRAMUSCULAR; INTRAVENOUS; SUBCUTANEOUS at 21:33

## 2024-12-15 RX ADMIN — HYDROMORPHONE HYDROCHLORIDE 0.5 MG: 1 INJECTION, SOLUTION INTRAMUSCULAR; INTRAVENOUS; SUBCUTANEOUS at 19:23

## 2024-12-15 RX ADMIN — KETOROLAC TROMETHAMINE 15 MG: 30 INJECTION, SOLUTION INTRAMUSCULAR; INTRAVENOUS at 11:46

## 2024-12-15 RX ADMIN — PANTOPRAZOLE SODIUM 40 MG: 40 INJECTION, POWDER, FOR SOLUTION INTRAVENOUS at 15:17

## 2024-12-15 RX ADMIN — SODIUM CHLORIDE 125 ML/HR: 9 INJECTION, SOLUTION INTRAVENOUS at 15:17

## 2024-12-15 NOTE — H&P
"[facility] Medicine Services  History & Physical    Patient Name: Guille Thompson  : 1975  MRN: [id number]  Primary Care Physician:  Nicolel Hyde MD  Date of admission: 12/15/2024  Date and Time of Service: 12/15/2024 at 1200    Subjective      Chief Complaint: Abdominal pain    History of Present Illness: Guille Thompson is a 49 y.o. male with a CMH of hypertension, sleep apnea not on CPAP, obesity who presented to [facility] on 12/15/2024 with abdominal pain for the past 2 days.  Patient reported he had left flank pain earlier in the week that resolved.  Patient reported 2 days ago he started to have epigastric pain, that progressively worsened with radiation throughout abdomen.  Patient reported initially he thought he may have some mild food poisoning or a kidney stone, but decided to come in for evaluation with worsening and persistent pain.  Patient reported symptoms started after having dinner at a restaurant 3 nights ago and reported he over ate and had bloating and burping.  Patient reported he has not vomited.  Patient reported nausea is currently controlled after receiving IV Zofran.  Patient denied shortness of breath, chest pain, fever, chills, and any other acute issue.    In the ED: pertinent labs include lipase 83, , WBC 11.93. CT showed \"Inflammation in the region of the pancreatic head or duodenal C-loop most concerning for early pancreatitis\". Patient received dicyclomine 20 mg, Toradol 15 mg, Zofran 4 mg, Compazine 10 mg. Hospitalist team to admit for further management.        Review of Systems   Constitutional:  Negative for chills and fever.   Respiratory:  Negative for chest tightness and shortness of breath.    Cardiovascular:  Negative for chest pain and leg swelling.   Gastrointestinal:  Positive for abdominal pain. Negative for constipation, diarrhea, nausea and vomiting.   Genitourinary:  Negative for dysuria and hematuria.       Personal " History     Past Medical History:   Diagnosis Date    Hypertension     Sleep apnea        Past Surgical History:   Procedure Laterality Date    APPENDECTOMY      TUNDE         Family History: family history includes Heart disease in his father. Otherwise pertinent FHx was reviewed and not pertinent to current issue.    Social History:  reports that he has never smoked. He has never used smokeless tobacco. He reports current alcohol use. He reports that he does not use drugs.    Home Medications:  Prior to Admission Medications       Prescriptions Last Dose Informant Patient Reported? Taking?    magnesium gluconate (MAGONATE) 500 MG tablet  Self Yes No    Take 27 mg by mouth Daily.    metoprolol succinate XL (TOPROL-XL) 25 MG 24 hr tablet  Self Yes No    Take 25 mg by mouth Daily.    multivitamin with minerals tablet tablet  Self Yes No    Take 1 tablet by mouth Daily.    omeprazole (priLOSEC) 40 MG capsule  Self Yes No    Take 40 mg by mouth Daily.    vitamin D3 125 MCG (5000 UT) capsule capsule  Self Yes No    Take 5,000 Units by mouth 2 (Two) Times a Day.              Allergies:  Allergies   Allergen Reactions    Penicillins Rash       Objective      Vitals:   Temp:  [97.6 °F (36.4 °C)] 97.6 °F (36.4 °C)  Heart Rate:  [54-71] 64  Resp:  [15-20] 15  BP: (140-171)/() 157/89  Body mass index is 40.05 kg/m².  Physical Exam  Constitutional:       Appearance: He is obese.   HENT:      Head: Normocephalic and atraumatic.      Nose: Nose normal.      Mouth/Throat:      Mouth: Mucous membranes are moist.      Pharynx: Oropharynx is clear.   Eyes:      Extraocular Movements: Extraocular movements intact.      Conjunctiva/sclera: Conjunctivae normal.      Pupils: Pupils are equal, round, and reactive to light.   Cardiovascular:      Rate and Rhythm: Regular rhythm. Bradycardia present.      Pulses: Normal pulses.      Heart sounds: Normal heart sounds.   Pulmonary:      Effort: Pulmonary effort is normal.      Breath  sounds: Normal breath sounds.   Abdominal:      General: Bowel sounds are normal.      Palpations: Abdomen is soft.      Tenderness: There is abdominal tenderness.   Musculoskeletal:         General: Normal range of motion.      Cervical back: Neck supple.   Skin:     General: Skin is warm and dry.   Neurological:      General: No focal deficit present.      Mental Status: He is alert and oriented to person, place, and time.   Psychiatric:         Mood and Affect: Mood normal.         Behavior: Behavior normal.         Thought Content: Thought content normal.         Judgment: Judgment normal.         Diagnostic Data:  Lab Results (last 24 hours)       Procedure Component Value Units Date/Time    Extra Tubes [[id number]] Collected: 12/15/24 1016    Specimen: Blood, Venous Line Updated: 12/15/24 1101    Narrative:      The following orders were created for panel order Extra Tubes.  Procedure                               Abnormality         Status                     ---------                               -----------         ------                     Gold Top - SST[[id number]]                                   Final result                 Please view results for these tests on the individual orders.    Gold Top - SST [[id number]] Collected: 12/15/24 1016    Specimen: Blood Updated: 12/15/24 1101     Extra Tube Hold for add-ons.     Comment: Auto resulted.       Comprehensive Metabolic Panel [[id number]]  (Abnormal) Collected: 12/15/24 1016    Specimen: Blood Updated: 12/15/24 1046     Glucose 106 mg/dL      BUN 9 mg/dL      Creatinine 0.94 mg/dL      Sodium 138 mmol/L      Potassium 3.9 mmol/L      Chloride 103 mmol/L      CO2 24.8 mmol/L      Calcium 9.2 mg/dL      Total Protein 7.0 g/dL      Albumin 3.9 g/dL      ALT (SGPT) 16 U/L      AST (SGOT) 24 U/L      Alkaline Phosphatase 73 U/L      Total Bilirubin 0.6 mg/dL      Globulin 3.1 gm/dL      A/G Ratio 1.3 g/dL      BUN/Creatinine Ratio 9.6     Anion Gap 10.2  mmol/L      eGFR 98.1 mL/min/1.73     Narrative:      GFR Categories in Chronic Kidney Disease (CKD)      GFR Category          GFR (mL/min/1.73)    Interpretation  G1                     90 or greater         Normal or high (1)  G2                      60-89                Mild decrease (1)  G3a                   45-59                Mild to moderate decrease  G3b                   30-44                Moderate to severe decrease  G4                    15-29                Severe decrease  G5                    14 or less           Kidney failure          (1)In the absence of evidence of kidney disease, neither GFR category G1 or G2 fulfill the criteria for CKD.    eGFR calculation 2021 CKD-EPI creatinine equation, which does not include race as a factor    Lipase [[id number]]  (Abnormal) Collected: 12/15/24 1016    Specimen: Blood Updated: 12/15/24 1046     Lipase 83 U/L     Urinalysis With Culture If Indicated - Urine, Clean Catch [[id number]]  (Normal) Collected: 12/15/24 1024    Specimen: Urine, Clean Catch Updated: 12/15/24 1035     Color, UA Yellow     Appearance, UA Clear     pH, UA 6.5     Specific Gravity, UA 1.019     Glucose, UA Negative     Ketones, UA Negative     Bilirubin, UA Negative     Blood, UA Negative     Protein, UA Negative     Leuk Esterase, UA Negative     Nitrite, UA Negative     Urobilinogen, UA 0.2 E.U./dL    Narrative:      In absence of clinical symptoms, the presence of pyuria, bacteria, and/or nitrites on the urinalysis result does not correlate with infection.  Urine microscopic not indicated.    CBC & Differential [[id number]]  (Abnormal) Collected: 12/15/24 1016    Specimen: Blood Updated: 12/15/24 1027    Narrative:      The following orders were created for panel order CBC & Differential.  Procedure                               Abnormality         Status                     ---------                               -----------         ------                     CBC Auto  Differential[[id number]]        Abnormal            Final result                 Please view results for these tests on the individual orders.    CBC Auto Differential [[id number]]  (Abnormal) Collected: 12/15/24 1016    Specimen: Blood Updated: 12/15/24 1027     WBC 11.93 10*3/mm3      RBC 5.08 10*6/mm3      Hemoglobin 14.4 g/dL      Hematocrit 45.9 %      MCV 90.4 fL      MCH 28.3 pg      MCHC 31.4 g/dL      RDW 13.2 %      RDW-SD 44.2 fl      MPV 10.3 fL      Platelets 200 10*3/mm3      Neutrophil % 77.2 %      Lymphocyte % 12.3 %      Monocyte % 9.2 %      Eosinophil % 0.7 %      Basophil % 0.3 %      Immature Grans % 0.3 %      Neutrophils, Absolute 9.21 10*3/mm3      Lymphocytes, Absolute 1.47 10*3/mm3      Monocytes, Absolute 1.10 10*3/mm3      Eosinophils, Absolute 0.08 10*3/mm3      Basophils, Absolute 0.04 10*3/mm3      Immature Grans, Absolute 0.03 10*3/mm3      nRBC 0.0 /100 WBC              Imaging Results (Last 24 Hours)       Procedure Component Value Units Date/Time    CT Abdomen Pelvis With Contrast [[id number]] Collected: 12/15/24 1134     Updated: 12/15/24 1141    Narrative:      CT ABDOMEN PELVIS W CONTRAST    Date of Exam: 12/15/2024 11:25 AM EST    Indication: Epigastric abdominal pain.    Comparison: None available.    Technique: Axial CT images were obtained of the abdomen and pelvis following the uneventful intravenous administration of iodinated contrast. Sagittal and coronal reconstructions were performed.  Automated exposure control and iterative reconstruction   methods were used.    FINDINGS:    Lung bases: No masses. No consolidation.    Liver:No masses. No intrahepatic biliary ductal dilatation.    Spleen:No masses. No perisplenic hematoma.    Pancreas: Inflammation in the region of the pancreatic head or duodenal C-loop    Gallbladder and common bile duct:No evidence of cholelithiasis. No evidence of cholecystitis.    Adrenal glands:No adrenal masses    Kidneys and ureters:No kidney  stones. No renal masses.No calculi present within the ureters. Normal caliber ureters.    Urinary bladder:No urinary bladder wall thickening. No bladder masses.    Small bowel:Normal caliber small bowel.    Large bowel:No diverticulosis or diverticulitis. No large bowel masses are appreciated    Appendix: Prior appendectomy    GENITOURINARY: Normal prostate    Ascites or pneumoperitoneum:None.    Adenopathy:None present    Osseous structures: The pubic bones are intact. The proximal femurs are intact. The hip joints are maintained. Degenerative changes of the lumbar spine.    Other findings: None      Impression:      Inflammation in the region of the pancreatic head or duodenal C-loop most concerning for early pancreatitis          Electronically Signed: Doug Gross MD    12/15/2024 11:39 AM EST    Workstation ID: [id number]              Assessment & Plan        This is a 49 y.o. male with:    Active and Resolved Problems  Active Hospital Problems    Diagnosis  POA    **Acute pancreatitis [K85.90]  Yes      Resolved Hospital Problems   No resolved problems to display.       Abdominal pain  Acute pancreatitis  - Possibly secondary to diet/triglycerides  - CT abd consistent with pancreatitis, see report above  - SIRS: does not meet SIRS criteria  - Lipase: 83  - WBC 11.93  - AM lipid panel ordered  - Fluid: NS @ 125 mL/hr  - Pain control: dilaudid 0.5 mg Q2H PRN  - Antinausea: zofran 4 mg Q6H PRN  - GI prophylaxis: protonix 40 IV daily  - VTE prophylaxis: enoxaparin 40 mg daily  - Nutrition: NPO while on IV pain medication, having N/V  - Continuous pulse ox, cardiac monitor    Hypertension  - Start hydralazine 10 mg IV PRN SBP >160, DBP >100 while NPO  - Continue home medications once off strict NPO  - Monitor BP per order    Asymptomatic bradycardia  - HR 50s, now in 60s  - EKG ordered  - Continuous cardiac monitor      VTE Prophylaxis:  Pharmacologic & mechanical VTE prophylaxis orders are present.        The  patient desires to be as follows:    CODE STATUS:    Level Of Support Discussed With: Patient  Code Status (Patient has no pulse and is not breathing): CPR (Attempt to Resuscitate)  Medical Interventions (Patient has pulse or is breathing): Full Support        Estrella Thompson, spouse, who can be contacted at [phone], is the designated person to make medical decisions on the patient's behalf if He is incapable of doing so. This was clarified with patient and/or next of kin on 12/15/2024 during the course of this H&P.    Admission Status:  I believe this patient meets inpatient status.    Expected Length of Stay: 1-2 days    PDMP and Medication Dispenses via Sidebar reviewed and consistent with patient reported medications.    I discussed the patient's findings and my recommendations with patient.      Signature:     This document has been electronically signed by GURVINDER Burger on December 15, 2024 15:28 [facility] Hospitalist Team

## 2024-12-15 NOTE — ED PROVIDER NOTES
Subjective   History of Present Illness  Patient is a 49-year-old male presenting to the ED for abdominal pain worsening over the past 2 days.  Patient states he went out of town last week, states when he came back he was having left-sided flank pain radiating into his groin and slight pain with urination, he does have a history of right-sided kidney stones.  States this pain has resolved.  2 days ago he went out to dinner, felt like he over ate and throughout the night felt bloated and had increased burping.  He tried using Tums and Pepto-Bismol with only slight improvement in symptoms.  He said last night and this morning he woke up with increasing pain in his get the gastric area that is a constant 5 out of 10 with intermittent radiation throughout his abdomen rating it an 8 out of 10.  He reports intermittent nausea and chills.  Denies any vomiting, fever, chest pain, dyspnea, dysuria, hematuria.  He reports a small bowel movement this morning, states he has been having trouble with irregular bowel movements.  Saw Dr. Christie GI doctor on Tuesday who plans to do an EGD and colonoscopy in a month.        Review of Systems   Constitutional:  Positive for chills. Negative for fever.   Respiratory:  Negative for shortness of breath.    Cardiovascular:  Negative for chest pain.   Gastrointestinal:  Positive for abdominal distention, abdominal pain and nausea. Negative for vomiting.   Genitourinary:  Negative for dysuria and hematuria.       Past Medical History:   Diagnosis Date    Hypertension     Sleep apnea        Allergies   Allergen Reactions    Penicillins Rash       Past Surgical History:   Procedure Laterality Date    APPENDECTOMY      TUNDE         Family History   Problem Relation Age of Onset    Heart disease Father        Social History     Socioeconomic History    Marital status:    Tobacco Use    Smoking status: Never    Smokeless tobacco: Never   Vaping Use    Vaping status: Never Used  "  Substance and Sexual Activity    Alcohol use: Yes     Comment: social    Drug use: Never           Objective   Physical Exam  Constitutional:       Appearance: He is well-developed.   HENT:      Head: Normocephalic and atraumatic.   Eyes:      Extraocular Movements: Extraocular movements intact.   Cardiovascular:      Rate and Rhythm: Normal rate and regular rhythm.      Heart sounds: Normal heart sounds.   Pulmonary:      Effort: Pulmonary effort is normal.      Breath sounds: Normal breath sounds.   Abdominal:      General: Bowel sounds are normal. There is distension.      Palpations: Abdomen is soft.      Tenderness: There is generalized abdominal tenderness and tenderness in the epigastric area.   Musculoskeletal:         General: No tenderness. Normal range of motion.      Cervical back: Normal range of motion.      Right lower leg: No edema.      Left lower leg: No edema.   Skin:     General: Skin is warm and dry.      Capillary Refill: Capillary refill takes less than 2 seconds.   Neurological:      General: No focal deficit present.      Mental Status: He is alert and oriented to person, place, and time.   Psychiatric:         Mood and Affect: Mood normal.         Behavior: Behavior normal.         Procedures           ED Course  ED Course as of 12/15/24 1225   Sun Dec 15, 2024   1222 Spoke with Krystal MANZANARES with hospitalist group who agrees admit patient. [EC]      ED Course User Index  [EC] Sowmya Soriano PA-C      /94   Pulse 54   Temp 97.6 °F (36.4 °C) (Oral)   Resp 15   Ht 177.8 cm (70\")   Wt 127 kg (279 lb 1.6 oz)   SpO2 97%   BMI 40.05 kg/m²   Labs Reviewed   COMPREHENSIVE METABOLIC PANEL - Abnormal; Notable for the following components:       Result Value    Glucose 106 (*)     All other components within normal limits    Narrative:     GFR Categories in Chronic Kidney Disease (CKD)      GFR Category          GFR (mL/min/1.73)    Interpretation  G1                     90 or greater      "    Normal or high (1)  G2                      60-89                Mild decrease (1)  G3a                   45-59                Mild to moderate decrease  G3b                   30-44                Moderate to severe decrease  G4                    15-29                Severe decrease  G5                    14 or less           Kidney failure          (1)In the absence of evidence of kidney disease, neither GFR category G1 or G2 fulfill the criteria for CKD.    eGFR calculation 2021 CKD-EPI creatinine equation, which does not include race as a factor   LIPASE - Abnormal; Notable for the following components:    Lipase 83 (*)     All other components within normal limits   CBC WITH AUTO DIFFERENTIAL - Abnormal; Notable for the following components:    WBC 11.93 (*)     MCHC 31.4 (*)     Neutrophil % 77.2 (*)     Lymphocyte % 12.3 (*)     Neutrophils, Absolute 9.21 (*)     Monocytes, Absolute 1.10 (*)     All other components within normal limits   URINALYSIS W/ CULTURE IF INDICATED - Normal    Narrative:     In absence of clinical symptoms, the presence of pyuria, bacteria, and/or nitrites on the urinalysis result does not correlate with infection.  Urine microscopic not indicated.   CBC AND DIFFERENTIAL    Narrative:     The following orders were created for panel order CBC & Differential.  Procedure                               Abnormality         Status                     ---------                               -----------         ------                     CBC Auto Differential[[id number]]        Abnormal            Final result                 Please view results for these tests on the individual orders.   EXTRA TUBES    Narrative:     The following orders were created for panel order Extra Tubes.  Procedure                               Abnormality         Status                     ---------                               -----------         ------                     Gold Top - [facility][[id number]]                                    Final result                 Please view results for these tests on the individual orders.   GOLD TOP - [facility]     Medications   ondansetron ODT (ZOFRAN-ODT) disintegrating tablet 4 mg (4 mg Oral Given 12/15/24 1010)   dicyclomine (BENTYL) capsule 20 mg (20 mg Oral Given 12/15/24 1010)   ketorolac (TORADOL) injection 15 mg (15 mg Intravenous Given 12/15/24 1146)   prochlorperazine (COMPAZINE) injection 10 mg (10 mg Intravenous Given 12/15/24 1146)   iopamidol (ISOVUE-370) 76 % injection 100 mL (100 mL Intravenous Given 12/15/24 1132)     CT Abdomen Pelvis With Contrast   Final Result   Inflammation in the region of the pancreatic head or duodenal C-loop most concerning for early pancreatitis               Electronically Signed: Doug Gross MD     12/15/2024 11:39 AM EST     Workstation ID: [id number]                                                           Medical Decision Making  Patient presented to the ED for the above complaint.    Patient underwent the above exam and evaluation.    While in the ED patient was placed in a gown and IV was established.  CT abdomen pelvis and blood work was obtained to assess for pancreatitis, cholecystitis, obstruction, electrolyte abnormality, dehydration.  Patient was given IV pain medicine and Zofran for symptoms.  Upon reevaluation patient is resting comfortably, reporting continuing pain.  Discussed findings with patient.  Educated him that we will be admitting him to the hospital due to early signs of pancreatitis on the CT and making him n.p.o.  Spoke with Krystal MANZANARES with hospitalist group who agreed to admit patient.  Patient voiced understanding, agreeable with dispo plan.    Labs were independently interpreted by myself and deemed remarkable for the following: WBC 11.93, glucose 106, lipase 83, urinalysis negative for infection.  CT abdomen and pelvis independently interpreted by radiologist and reviewed by myself showing: Inflammation in region of  pancreatic head or duodenal C-loop concerning for early pancreatitis.    Appropriate PPE was worn during each patient encounter.    Based on clinical findings anticipate this patient will require 2 midnight stay.      Problems Addressed:  Acute pancreatitis without infection or necrosis, unspecified pancreatitis type: complicated acute illness or injury  Epigastric abdominal pain: complicated acute illness or injury  Nausea: complicated acute illness or injury    Amount and/or Complexity of Data Reviewed  Labs: ordered.  Radiology: ordered.    Risk  Prescription drug management.        Final diagnoses:   Epigastric abdominal pain   Nausea   Acute pancreatitis without infection or necrosis, unspecified pancreatitis type       ED Disposition  ED Disposition       ED Disposition   Decision to Admit    Condition   --    Comment   Level of Care: Telemetry [5]   Diagnosis: Acute pancreatitis [577.0.ICD-9-CM]   Admitting Physician: BARRERA POWELL [[id number]]   Certification: I Certify That Inpatient Hospital Services Are Medically Necessary For Greater Than 2 Midnights                 No follow-up provider specified.       Medication List      No changes were made to your prescriptions during this visit.            Sowmya Soriano PA-C  12/15/24 1306

## 2024-12-15 NOTE — ED NOTES
Nursing report ED to floor  Guille Thompson  49 y.o.  male    HPI:   Chief Complaint   Patient presents with    Abdominal Pain     Abd pain all over,  pain started 2 days ago,  pt states he overate on Friday and that flared it up.  Pt has had some soft stools.         Admitting doctor:   Arabella Villagran MD    Admitting diagnosis:   The primary encounter diagnosis was Epigastric abdominal pain. Diagnoses of Nausea and Acute pancreatitis without infection or necrosis, unspecified pancreatitis type were also pertinent to this visit.    Code status:   Current Code Status       Date Active Code Status Order ID Comments User Context       12/15/2024 1528 CPR (Attempt to Resuscitate) [id number]  Krystal Gilbert APRN ED        Question Answer    Code Status (Patient has no pulse and is not breathing) CPR (Attempt to Resuscitate)    Medical Interventions (Patient has pulse or is breathing) Full Support    Level Of Support Discussed With Patient                    Allergies:   Penicillins    Isolation:  No active isolations     Fall Risk:  Fall Risk Assessment was completed, and patient is at low risk for falls.   Predictive Model Details         5 (Low) Factor Value    Calculated 12/15/2024 17:11 Age 49    Risk of Fall Model Active Peripheral IV Present     Imaging order in this encounter Present     Number of Distinct Medication Classes administered 7     Magnesium not on file     Kash Scale not on file     Number of administrations of Anti-Rheumatics 1     Number of administrations of Ulcer Drugs 2     Diastolic BP 79     Clinically Relevant Sex Not Female     Albumin 3.9 g/dL     Cardiac Assessment X     Number of administrations of Analgesic Narcotics 1     Total Bilirubin 0.6 mg/dL     Calcium 9.2 mg/dL     Gastrointestinal Assessment X     Days after Admission 0.32     Chloride 103 mmol/L     Creatinine 0.94 mg/dL     Potassium 3.9 mmol/L     Respiratory Rate 15     ALT 16 U/L         Weight:       12/15/24  0991    Weight: 127 kg (279 lb 1.6 oz)       Intake and Output  No intake or output data in the 24 hours ending 12/15/24 1715    Diet:   Dietary Orders (From admission, onward)       Start     Ordered    12/15/24 1146  NPO Diet NPO Type: Strict NPO  Diet Effective Now        Question:  NPO Type  Answer:  Strict NPO    12/15/24 1145                     Most recent vitals:   Vitals:    12/15/24 1603 12/15/24 1639 12/15/24 1650 12/15/24 1702   BP: (!) 159/104 (!) 188/101 (!) 178/107 149/79   BP Location:       Patient Position:       Pulse: 62 66 67 60   Resp:       Temp:       TempSrc:       SpO2: 98% 100% 98% 94%   Weight:       Height:           Active LDAs/IV Access:   Lines, Drains & Airways       Active LDAs       Name Placement date Placement time Site Days    Peripheral IV 12/15/24 1108 Left Antecubital 12/15/24  1108  Antecubital  less than 1                    Skin Condition:   Skin Assessments (last day)       Date/Time Skin WDL    12/15/24 09:34:47 WDL             Labs (abnormal labs have a star):   Labs Reviewed   COMPREHENSIVE METABOLIC PANEL - Abnormal; Notable for the following components:       Result Value    Glucose 106 (*)     All other components within normal limits    Narrative:     GFR Categories in Chronic Kidney Disease (CKD)      GFR Category          GFR (mL/min/1.73)    Interpretation  G1                     90 or greater         Normal or high (1)  G2                      60-89                Mild decrease (1)  G3a                   45-59                Mild to moderate decrease  G3b                   30-44                Moderate to severe decrease  G4                    15-29                Severe decrease  G5                    14 or less           Kidney failure          (1)In the absence of evidence of kidney disease, neither GFR category G1 or G2 fulfill the criteria for CKD.    eGFR calculation 2021 CKD-EPI creatinine equation, which does not include race as a factor   LIPASE - Abnormal;  Notable for the following components:    Lipase 83 (*)     All other components within normal limits   CBC WITH AUTO DIFFERENTIAL - Abnormal; Notable for the following components:    WBC 11.93 (*)     MCHC 31.4 (*)     Neutrophil % 77.2 (*)     Lymphocyte % 12.3 (*)     Neutrophils, Absolute 9.21 (*)     Monocytes, Absolute 1.10 (*)     All other components within normal limits   URINALYSIS W/ CULTURE IF INDICATED - Normal    Narrative:     In absence of clinical symptoms, the presence of pyuria, bacteria, and/or nitrites on the urinalysis result does not correlate with infection.  Urine microscopic not indicated.   CBC AND DIFFERENTIAL    Narrative:     The following orders were created for panel order CBC & Differential.  Procedure                               Abnormality         Status                     ---------                               -----------         ------                     CBC Auto Differential[[id number]]        Abnormal            Final result                 Please view results for these tests on the individual orders.   EXTRA TUBES    Narrative:     The following orders were created for panel order Extra Tubes.  Procedure                               Abnormality         Status                     ---------                               -----------         ------                     Gold Top - [facility][[id number]]                                   Final result                 Please view results for these tests on the individual orders.   GOLD TOP - [facility]       LOC: Person, Place, Time, and Situation    Telemetry:  Telemetry    Cardiac Monitoring Ordered: yes      EKG:   ECG 12 Lead Bradycardia   Preliminary Result   HEART RATE=55  bpm   RR Vqjwywbz=5250  ms   CA Ljgthwjz=924  ms   P Horizontal Axis=42  deg   P Front Axis=37  deg   QRSD Interval=94  ms   QT Bdgciixr=645  ms   HCoA=457  ms   QRS Axis=1  deg   T Wave Axis=1  deg   - OTHERWISE NORMAL ECG -   Sinus bradycardia   Date and Time of  Study:2024-12-15 16:46:33          Medications Given in the ED:   Medications   sodium chloride 0.9 % flush 10 mL (10 mL Intravenous Given 12/15/24 1517)   sodium chloride 0.9 % flush 10 mL (has no administration in time range)   sodium chloride 0.9 % infusion 40 mL (has no administration in time range)   nitroglycerin (NITROSTAT) SL tablet 0.4 mg (has no administration in time range)   Potassium Replacement - Follow Nurse / BPA Driven Protocol (has no administration in time range)   Magnesium Standard Dose Replacement - Follow Nurse / BPA Driven Protocol (has no administration in time range)   Phosphorus Replacement - Follow Nurse / BPA Driven Protocol (has no administration in time range)   Calcium Replacement - Follow Nurse / BPA Driven Protocol (has no administration in time range)   sennosides-docusate (PERICOLACE) 8.6-50 MG per tablet 2 tablet (has no administration in time range)     And   polyethylene glycol (MIRALAX) packet 17 g (has no administration in time range)     And   bisacodyl (DULCOLAX) EC tablet 5 mg (has no administration in time range)     And   bisacodyl (DULCOLAX) suppository 10 mg (has no administration in time range)   ondansetron ODT (ZOFRAN-ODT) disintegrating tablet 4 mg ( Oral Not Given:  See Alt 12/15/24 1637)     Or   ondansetron (ZOFRAN) injection 4 mg (4 mg Intravenous Given 12/15/24 1637)   pantoprazole (PROTONIX) injection 40 mg (40 mg Intravenous Given 12/15/24 1517)   sodium chloride 0.9 % infusion (125 mL/hr Intravenous New Bag 12/15/24 1517)   HYDROmorphone (DILAUDID) injection 0.5 mg (0.5 mg Intravenous Given 12/15/24 1638)   Enoxaparin Sodium (LOVENOX) syringe 40 mg (has no administration in time range)   hydrALAZINE (APRESOLINE) injection 10 mg (has no administration in time range)   ondansetron ODT (ZOFRAN-ODT) disintegrating tablet 4 mg (4 mg Oral Given 12/15/24 1010)   dicyclomine (BENTYL) capsule 20 mg (20 mg Oral Given 12/15/24 1010)   ketorolac (TORADOL) injection 15  mg (15 mg Intravenous Given 12/15/24 1146)   prochlorperazine (COMPAZINE) injection 10 mg (10 mg Intravenous Given 12/15/24 1146)   iopamidol (ISOVUE-370) 76 % injection 100 mL (100 mL Intravenous Given 12/15/24 1132)       Imaging results:  CT Abdomen Pelvis With Contrast    Result Date: 12/15/2024  Inflammation in the region of the pancreatic head or duodenal C-loop most concerning for early pancreatitis Electronically Signed: Doug Gross MD  12/15/2024 11:39 AM EST  Workstation ID: [id number]     Social issues:   Social History     Socioeconomic History    Marital status:    Tobacco Use    Smoking status: Never    Smokeless tobacco: Never   Vaping Use    Vaping status: Never Used   Substance and Sexual Activity    Alcohol use: Yes     Comment: social    Drug use: Never       NIH Stroke Scale:  Interval: (not recorded)  1a. Level of Consciousness: (not recorded)  1b. LOC Questions: (not recorded)  1c. LOC Commands: (not recorded)  2. Best Gaze: (not recorded)  3. Visual: (not recorded)  4. Facial Palsy: (not recorded)  5a. Motor Arm, Left: (not recorded)  5b. Motor Arm, Right: (not recorded)  6a. Motor Leg, Left: (not recorded)  6b. Motor Leg, Right: (not recorded)  7. Limb Ataxia: (not recorded)  8. Sensory: (not recorded)  9. Best Language: (not recorded)  10. Dysarthria: (not recorded)  11. Extinction and Inattention (formerly Neglect): (not recorded)    Total (NIH Stroke Scale): (not recorded)     Additional notable assessment information:     Nursing report ED to floor:  BARBARA Navarro RN   12/15/24 17:15 EST

## 2024-12-16 LAB
AMPHET+METHAMPHET UR QL: NEGATIVE
AMPHETAMINES UR QL: NEGATIVE
ANION GAP SERPL CALCULATED.3IONS-SCNC: 11 MMOL/L (ref 5–15)
BARBITURATES UR QL SCN: NEGATIVE
BASOPHILS # BLD AUTO: 0.04 10*3/MM3 (ref 0–0.2)
BASOPHILS NFR BLD AUTO: 0.2 % (ref 0–1.5)
BENZODIAZ UR QL SCN: NEGATIVE
BUN SERPL-MCNC: 7 MG/DL (ref 6–20)
BUN/CREAT SERPL: 7.4 (ref 7–25)
BUPRENORPHINE SERPL-MCNC: NEGATIVE NG/ML
CALCIUM SPEC-SCNC: 9.3 MG/DL (ref 8.6–10.5)
CANNABINOIDS SERPL QL: NEGATIVE
CHLORIDE SERPL-SCNC: 102 MMOL/L (ref 98–107)
CHOLEST SERPL-MCNC: 142 MG/DL (ref 0–200)
CO2 SERPL-SCNC: 23 MMOL/L (ref 22–29)
COCAINE UR QL: NEGATIVE
CREAT SERPL-MCNC: 0.95 MG/DL (ref 0.76–1.27)
DEPRECATED RDW RBC AUTO: 43.8 FL (ref 37–54)
EGFRCR SERPLBLD CKD-EPI 2021: 96.9 ML/MIN/1.73
EOSINOPHIL # BLD AUTO: 0.1 10*3/MM3 (ref 0–0.4)
EOSINOPHIL NFR BLD AUTO: 0.6 % (ref 0.3–6.2)
ERYTHROCYTE [DISTWIDTH] IN BLOOD BY AUTOMATED COUNT: 13.3 % (ref 12.3–15.4)
GLUCOSE SERPL-MCNC: 105 MG/DL (ref 65–99)
HCT VFR BLD AUTO: 45.9 % (ref 37.5–51)
HDLC SERPL-MCNC: 58 MG/DL (ref 40–60)
HGB BLD-MCNC: 14.6 G/DL (ref 13–17.7)
IMM GRANULOCYTES # BLD AUTO: 0.07 10*3/MM3 (ref 0–0.05)
IMM GRANULOCYTES NFR BLD AUTO: 0.4 % (ref 0–0.5)
LDLC SERPL CALC-MCNC: 74 MG/DL (ref 0–100)
LDLC/HDLC SERPL: 1.29 {RATIO}
LYMPHOCYTES # BLD AUTO: 1.19 10*3/MM3 (ref 0.7–3.1)
LYMPHOCYTES NFR BLD AUTO: 6.8 % (ref 19.6–45.3)
MAGNESIUM SERPL-MCNC: 1.9 MG/DL (ref 1.6–2.6)
MCH RBC QN AUTO: 28.6 PG (ref 26.6–33)
MCHC RBC AUTO-ENTMCNC: 31.8 G/DL (ref 31.5–35.7)
MCV RBC AUTO: 89.8 FL (ref 79–97)
METHADONE UR QL SCN: NEGATIVE
MONOCYTES # BLD AUTO: 2.21 10*3/MM3 (ref 0.1–0.9)
MONOCYTES NFR BLD AUTO: 12.7 % (ref 5–12)
NEUTROPHILS NFR BLD AUTO: 13.83 10*3/MM3 (ref 1.7–7)
NEUTROPHILS NFR BLD AUTO: 79.3 % (ref 42.7–76)
NRBC BLD AUTO-RTO: 0 /100 WBC (ref 0–0.2)
OPIATES UR QL: POSITIVE
OXYCODONE UR QL SCN: POSITIVE
PCP UR QL SCN: NEGATIVE
PHOSPHATE SERPL-MCNC: 2.9 MG/DL (ref 2.5–4.5)
PLATELET # BLD AUTO: 193 10*3/MM3 (ref 140–450)
PMV BLD AUTO: 10.5 FL (ref 6–12)
POTASSIUM SERPL-SCNC: 3.7 MMOL/L (ref 3.5–5.2)
QT INTERVAL: 405 MS
QTC INTERVAL: 387 MS
RBC # BLD AUTO: 5.11 10*6/MM3 (ref 4.14–5.8)
SODIUM SERPL-SCNC: 136 MMOL/L (ref 136–145)
TRICYCLICS UR QL SCN: NEGATIVE
TRIGL SERPL-MCNC: 46 MG/DL (ref 0–150)
VLDLC SERPL-MCNC: 10 MG/DL (ref 5–40)
WBC NRBC COR # BLD AUTO: 17.44 10*3/MM3 (ref 3.4–10.8)

## 2024-12-16 PROCEDURE — 80306 DRUG TEST PRSMV INSTRMNT: CPT | Performed by: HOSPITALIST

## 2024-12-16 PROCEDURE — 25810000003 LACTATED RINGERS PER 1000 ML: Performed by: HOSPITALIST

## 2024-12-16 PROCEDURE — 80048 BASIC METABOLIC PNL TOTAL CA: CPT

## 2024-12-16 PROCEDURE — 25010000002 HYDROMORPHONE 1 MG/ML SOLUTION: Performed by: HOSPITALIST

## 2024-12-16 PROCEDURE — 85025 COMPLETE CBC W/AUTO DIFF WBC: CPT

## 2024-12-16 PROCEDURE — 25010000002 ENOXAPARIN PER 10 MG

## 2024-12-16 PROCEDURE — 84100 ASSAY OF PHOSPHORUS: CPT

## 2024-12-16 PROCEDURE — 83735 ASSAY OF MAGNESIUM: CPT

## 2024-12-16 PROCEDURE — 25010000002 ONDANSETRON PER 1 MG

## 2024-12-16 PROCEDURE — 25010000002 HYDROMORPHONE PER 4 MG

## 2024-12-16 PROCEDURE — 80061 LIPID PANEL: CPT

## 2024-12-16 RX ORDER — OXYCODONE HYDROCHLORIDE 5 MG/1
5 TABLET ORAL EVERY 4 HOURS PRN
Status: DISCONTINUED | OUTPATIENT
Start: 2024-12-16 | End: 2024-12-18 | Stop reason: HOSPADM

## 2024-12-16 RX ORDER — SODIUM CHLORIDE, SODIUM LACTATE, POTASSIUM CHLORIDE, CALCIUM CHLORIDE 600; 310; 30; 20 MG/100ML; MG/100ML; MG/100ML; MG/100ML
150 INJECTION, SOLUTION INTRAVENOUS CONTINUOUS
Status: DISPENSED | OUTPATIENT
Start: 2024-12-16 | End: 2024-12-18

## 2024-12-16 RX ORDER — METOPROLOL SUCCINATE 25 MG/1
25 TABLET, EXTENDED RELEASE ORAL DAILY
Status: DISCONTINUED | OUTPATIENT
Start: 2024-12-16 | End: 2024-12-18 | Stop reason: HOSPADM

## 2024-12-16 RX ADMIN — HYDROMORPHONE HYDROCHLORIDE 0.5 MG: 1 INJECTION, SOLUTION INTRAMUSCULAR; INTRAVENOUS; SUBCUTANEOUS at 07:43

## 2024-12-16 RX ADMIN — OXYCODONE 5 MG: 5 TABLET ORAL at 15:17

## 2024-12-16 RX ADMIN — HYDROMORPHONE HYDROCHLORIDE 1 MG: 1 INJECTION, SOLUTION INTRAMUSCULAR; INTRAVENOUS; SUBCUTANEOUS at 11:04

## 2024-12-16 RX ADMIN — OXYCODONE 5 MG: 5 TABLET ORAL at 19:33

## 2024-12-16 RX ADMIN — HYDROMORPHONE HYDROCHLORIDE 1 MG: 1 INJECTION, SOLUTION INTRAMUSCULAR; INTRAVENOUS; SUBCUTANEOUS at 16:35

## 2024-12-16 RX ADMIN — BISACODYL 5 MG: 5 TABLET, COATED ORAL at 15:17

## 2024-12-16 RX ADMIN — PANTOPRAZOLE SODIUM 40 MG: 40 INJECTION, POWDER, FOR SOLUTION INTRAVENOUS at 07:43

## 2024-12-16 RX ADMIN — Medication 10 ML: at 09:28

## 2024-12-16 RX ADMIN — ENOXAPARIN SODIUM 40 MG: 100 INJECTION SUBCUTANEOUS at 21:35

## 2024-12-16 RX ADMIN — ONDANSETRON 4 MG: 2 INJECTION INTRAMUSCULAR; INTRAVENOUS at 02:12

## 2024-12-16 RX ADMIN — HYDROMORPHONE HYDROCHLORIDE 0.5 MG: 1 INJECTION, SOLUTION INTRAMUSCULAR; INTRAVENOUS; SUBCUTANEOUS at 02:12

## 2024-12-16 RX ADMIN — SODIUM CHLORIDE, SODIUM LACTATE, POTASSIUM CHLORIDE, CALCIUM CHLORIDE 150 ML/HR: 20; 30; 600; 310 INJECTION, SOLUTION INTRAVENOUS at 19:34

## 2024-12-16 RX ADMIN — SODIUM CHLORIDE, SODIUM LACTATE, POTASSIUM CHLORIDE, CALCIUM CHLORIDE 150 ML/HR: 20; 30; 600; 310 INJECTION, SOLUTION INTRAVENOUS at 10:03

## 2024-12-16 RX ADMIN — Medication 10 ML: at 21:36

## 2024-12-16 RX ADMIN — ENOXAPARIN SODIUM 40 MG: 100 INJECTION SUBCUTANEOUS at 07:43

## 2024-12-16 RX ADMIN — OXYCODONE 5 MG: 5 TABLET ORAL at 09:46

## 2024-12-16 RX ADMIN — HYDROMORPHONE HYDROCHLORIDE 0.5 MG: 1 INJECTION, SOLUTION INTRAMUSCULAR; INTRAVENOUS; SUBCUTANEOUS at 04:57

## 2024-12-16 NOTE — PLAN OF CARE
Goal Outcome Evaluation:         Pt alert and orient x 4. Makes needs known verbally. Pt complaint with plan of care at this time. LR runnign at 150. Pt NPO.

## 2024-12-16 NOTE — CASE MANAGEMENT/SOCIAL WORK
Discharge Planning Assessment   Trey     Patient Name: Guille Thompson  MRN: [id number]  Today's Date: 12/16/2024    Admit Date: 12/15/2024    Plan: Routine home.   Discharge Needs Assessment       Row Name 12/16/24 1049       Living Environment    People in Home spouse;child(delaney), dependent    Name(s) of People in Home Wife-Estrella; 2 children (19 and 17 years old)    Current Living Arrangements home    Potentially Unsafe Housing Conditions none    In the past 12 months has the electric, gas, oil, or water company threatened to shut off services in your home? No    Primary Care Provided by self    Provides Primary Care For no one    Family Caregiver if Needed spouse    Family Caregiver Names Estrella    Quality of Family Relationships helpful;involved;supportive    Able to Return to Prior Arrangements yes       Resource/Environmental Concerns    Resource/Environmental Concerns none    Transportation Concerns none       Transportation Needs    In the past 12 months, has lack of transportation kept you from medical appointments or from getting medications? no    In the past 12 months, has lack of transportation kept you from meetings, work, or from getting things needed for daily living? No       Food Insecurity    Within the past 12 months, you worried that your food would run out before you got the money to buy more. Never true    Within the past 12 months, the food you bought just didn't last and you didn't have money to get more. Never true       Transition Planning    Patient/Family Anticipates Transition to home;home with family    Patient/Family Anticipated Services at Transition none    Transportation Anticipated car, drives self;family or friend will provide       Discharge Needs Assessment    Readmission Within the Last 30 Days no previous admission in last 30 days    Equipment Currently Used at Home bp cuff;pulse ox    Concerns to be Addressed denies needs/concerns at this time;no discharge needs identified     Do you want help finding or keeping work or a job? Patient declined    Do you want help with school or training? For example, starting or completing job training or getting a high school diploma, GED or equivalent No    Anticipated Changes Related to Illness none    Equipment Needed After Discharge none    Provided Post Acute Provider List? N/A    Provided Post Acute Provider Quality & Resource List? N/A                   Discharge Plan       Row Name 12/16/24 1050       Plan    Plan Routine home.    Patient/Family in Agreement with Plan yes    Plan Comments CM met with patient at bedside. Pt lives at home with wife Estrella and 2 teenage children; pt drives and independent with ADL's. PCP and pharmacy confirmed. No issues affording medications. Declined use of [facility]. Only DME used includes BP cuff and EKG/pulse ox. Wife to provide transportation at discharge.              Demographic Summary       Row Name 12/16/24 1048       General Information    Admission Type inpatient    Arrived From emergency department    Referral Source admission list    Reason for Consult care coordination/care conference;discharge planning    Preferred Language English       Contact Information    Permission Granted to Share Info With                    Functional Status       Row Name 12/16/24 1042       Functional Status    Usual Activity Tolerance good    Current Activity Tolerance good       Functional Status, IADL    Medications independent    Meal Preparation independent    Housekeeping independent    Laundry independent    Shopping independent    If for any reason you need help with day-to-day activities such as bathing, preparing meals, shopping, managing finances, etc., do you get the help you need? I don't need any help       Employment/    Employment Status employed full-time    Current or Previous Occupation self-employed             Megan Naegele, RN     Office Phone:  [phone]  [facility] Cell: [phone]

## 2024-12-16 NOTE — PLAN OF CARE
Goal Outcome Evaluation:  Plan of Care Reviewed With: patient        Progress: no change        Pt currently resting abed. Pt c.o nauseas, headache, and abd pain this shift see mar, no distress noted. VSS cont plan of care.

## 2024-12-16 NOTE — PROGRESS NOTES
[facility] MEDICINE SERVICE  DAILY PROGRESS NOTE    NAME: Guille Thompson  : 1975  MRN: [id number]      LOS: 1 day     PROVIDER OF SERVICE: Parker Shah MD    Chief Complaint: Acute pancreatitis    Subjective:     Interval History:  History taken from: patient chart family RN    Abdominal pain somewhat better still requiring IV pain medications  Spouse at bedside        Review of Systems:   Review of Systems  All negative except as above  Objective:     Vital Signs  Temp:  [97.6 °F (36.4 °C)-99.4 °F (37.4 °C)] 98.8 °F (37.1 °C)  Heart Rate:  [54-83] 81  Resp:  [13-20] 14  BP: (139-188)/() 174/96   Body mass index is 40.05 kg/m².    Physical Exam  Physical Exam  AOx3 NAD  RRR S1-S2 audible  Lungs with fair air entry  Abdomen soft nontender nondistended     Diagnostic Data    Results from last 7 days   Lab Units 24  0551 12/15/24  1016   WBC 10*3/mm3 17.44* 11.93*   HEMOGLOBIN g/dL 14.6 14.4   HEMATOCRIT % 45.9 45.9   PLATELETS 10*3/mm3 193 200   GLUCOSE mg/dL 105* 106*   CREATININE mg/dL 0.95 0.94   BUN mg/dL 7 9   SODIUM mmol/L 136 138   POTASSIUM mmol/L 3.7 3.9   AST (SGOT) U/L  --  24   ALT (SGPT) U/L  --  16   ALK PHOS U/L  --  73   BILIRUBIN mg/dL  --  0.6   ANION GAP mmol/L 11.0 10.2       CT Abdomen Pelvis With Contrast    Result Date: 12/15/2024  Inflammation in the region of the pancreatic head or duodenal C-loop most concerning for early pancreatitis Electronically Signed: Doug Gross MD  12/15/2024 11:39 AM EST  Workstation ID: [id number]       I reviewed the patient's new clinical results.  I reviewed the patient's new imaging results and agree with the interpretation.    Assessment/Plan:     Active and Resolved Problems  Active Hospital Problems    Diagnosis  POA    **Acute pancreatitis [K85.90]  Yes      Resolved Hospital Problems   No resolved problems to display.       49-year-old male with history of hypertension, sleep apnea not on CPAP, morbid obesity, admitted to  [facility] 12/15 with abdominal pain      #Acute pancreatitis      Reports significant EtOH intake.  However did start taking over-the-counter herbal supplements which may have contributed to pancreatitis    Keep n.p.o. except p.o. medications  Change IV fluids to LR  Bowel rest today  Starting p.o. oxycodone as needed for moderate pain.  Increase IV Dilaudid as needed for severe pain  Discontinue herbal supplements  Check U tox  Monitor leukocytosis suspect reactive    #Hypertension  Restart Toprol  IV hydralazine for SBP more than 160      VTE Prophylaxis:  Pharmacologic & mechanical VTE prophylaxis orders are present.             Disposition Planning:     Barriers to Discharge: Medical workup  Anticipated Date of Discharge: 12/18  Place of Discharge: Home      Time: 45 minutes     Code Status and Medical Interventions: CPR (Attempt to Resuscitate); Full Support   Ordered at: 12/15/24 1528     Level Of Support Discussed With:    Patient     Code Status (Patient has no pulse and is not breathing):    CPR (Attempt to Resuscitate)     Medical Interventions (Patient has pulse or is breathing):    Full Support       Signature: Electronically signed by Parker Shah MD, 12/16/24, 08:38 EST.  [facility] Hospitalist Team

## 2024-12-16 NOTE — PAYOR COMM NOTE
"CLINICALS FOR PENDING INPATIENT PRECERT:        AUTH # [id number]  Kashmir Thompson \"KAMINI\" (49 y.o. Male) 1975        AUTHORIZATION PENDING:   PLEASE CALL OR FAX DETERMINATION TO CONTACT BELOW. THANK YOU.        Katie Ramirez RN MSN  /UR  [facility]  [phone] office  [phone] fax  [email or web address]    [facility]  NPI: [id number]  Tax: [id number]        Kashmir Thompson \"KAMINI\" (49 y.o. Male)       Date of Birth   1975    Social Security Number       Address   [address] [place] [address]    Home Phone   [phone]    MRN   [id number]       Anabaptism   None    Marital Status                               Admission Date   12/15/24    Admission Type   Emergency    Admitting Provider   Arabella Villagran MD    Attending Provider   Parker Shah MD    Department, Room/Bed   [facility] MEDICAL INPATIENT, 359/1       Discharge Date       Discharge Disposition       Discharge Destination                                 Attending Provider: Parker Shah MD    Allergies: Penicillins    Isolation: None   Infection: None   Code Status: CPR    Ht: 177.8 cm (70\")   Wt: 127 kg (279 lb 1.6 oz)    Admission Cmt: None   Principal Problem: Acute pancreatitis [K85.90]                   Active Insurance as of 12/15/2024       Primary Coverage       Payor Plan Insurance Group Employer/Plan Group    [id number]       Payor Plan Address Payor Plan Phone Number Payor Plan Fax Number Effective Dates    [address]   1/19/2008 - None Entered    [facility] [id number]         Subscriber Name Subscriber Birth Date Member ID       KASHMIR THOMPSON 1975 [id number]                     Emergency Contacts        (Rel.) Home Phone Work Phone Mobile Phone    NAVYA THOMPSON (Spouse) -- -- [phone]          12/15/24 1225  Inpatient Admission  Once     Completed     Level of Care: " "Telemetry  Diagnosis: Acute pancreatitis [577.0.ICD-9-CM]  Admitting Physician: ARABELLA VILLAGRAN [[id number]]  Certification: I Certify That Inpatient Hospital Services Are Medically Necessary For Greater Than 2 Midnights               History & Physical        SanjaybeatricephuongLiz castGURVINDER Jackson at 12/15/24 1220       Attestation signed by Arabella Villagran MD at 12/15/24 1926    I have reviewed this documentation and agree.                      [facility] Medicine Services  History & Physical    Patient Name: Guille Thompson  : 1975  MRN: [id number]  Primary Care Physician:  Nicolle Hyde MD  Date of admission: 12/15/2024  Date and Time of Service: 12/15/2024 at 1200    Subjective      Chief Complaint: Abdominal pain    History of Present Illness: Guille Thompson is a 49 y.o. male with a CMH of hypertension, sleep apnea not on CPAP, obesity who presented to [facility] on 12/15/2024 with abdominal pain for the past 2 days.  Patient reported he had left flank pain earlier in the week that resolved.  Patient reported 2 days ago he started to have epigastric pain, that progressively worsened with radiation throughout abdomen.  Patient reported initially he thought he may have some mild food poisoning or a kidney stone, but decided to come in for evaluation with worsening and persistent pain.  Patient reported symptoms started after having dinner at a restaurant 3 nights ago and reported he over ate and had bloating and burping.  Patient reported he has not vomited.  Patient reported nausea is currently controlled after receiving IV Zofran.  Patient denied shortness of breath, chest pain, fever, chills, and any other acute issue.    In the ED: pertinent labs include lipase 83, , WBC 11.93. CT showed \"Inflammation in the region of the pancreatic head or duodenal C-loop most concerning for early pancreatitis\". Patient received dicyclomine 20 mg, Toradol 15 mg, Zofran 4 mg, Compazine 10 mg. Hospitalist team to " admit for further management.        Review of Systems   Constitutional:  Negative for chills and fever.   Respiratory:  Negative for chest tightness and shortness of breath.    Cardiovascular:  Negative for chest pain and leg swelling.   Gastrointestinal:  Positive for abdominal pain. Negative for constipation, diarrhea, nausea and vomiting.   Genitourinary:  Negative for dysuria and hematuria.       Personal History     Past Medical History:   Diagnosis Date    Hypertension     Sleep apnea        Past Surgical History:   Procedure Laterality Date    APPENDECTOMY      LASIK         Family History: family history includes Heart disease in his father. Otherwise pertinent FHx was reviewed and not pertinent to current issue.    Social History:  reports that he has never smoked. He has never used smokeless tobacco. He reports current alcohol use. He reports that he does not use drugs.    Home Medications:  Prior to Admission Medications       Prescriptions Last Dose Informant Patient Reported? Taking?    magnesium gluconate (MAGONATE) 500 MG tablet  Self Yes No    Take 27 mg by mouth Daily.    metoprolol succinate XL (TOPROL-XL) 25 MG 24 hr tablet  Self Yes No    Take 25 mg by mouth Daily.    multivitamin with minerals tablet tablet  Self Yes No    Take 1 tablet by mouth Daily.    omeprazole (priLOSEC) 40 MG capsule  Self Yes No    Take 40 mg by mouth Daily.    vitamin D3 125 MCG (5000 UT) capsule capsule  Self Yes No    Take 5,000 Units by mouth 2 (Two) Times a Day.              Allergies:  Allergies   Allergen Reactions    Penicillins Rash       Objective      Vitals:   Temp:  [97.6 °F (36.4 °C)] 97.6 °F (36.4 °C)  Heart Rate:  [54-71] 64  Resp:  [15-20] 15  BP: (140-171)/() 157/89  Body mass index is 40.05 kg/m².  Physical Exam  Constitutional:       Appearance: He is obese.   HENT:      Head: Normocephalic and atraumatic.      Nose: Nose normal.      Mouth/Throat:      Mouth: Mucous membranes are moist.       Pharynx: Oropharynx is clear.   Eyes:      Extraocular Movements: Extraocular movements intact.      Conjunctiva/sclera: Conjunctivae normal.      Pupils: Pupils are equal, round, and reactive to light.   Cardiovascular:      Rate and Rhythm: Regular rhythm. Bradycardia present.      Pulses: Normal pulses.      Heart sounds: Normal heart sounds.   Pulmonary:      Effort: Pulmonary effort is normal.      Breath sounds: Normal breath sounds.   Abdominal:      General: Bowel sounds are normal.      Palpations: Abdomen is soft.      Tenderness: There is abdominal tenderness.   Musculoskeletal:         General: Normal range of motion.      Cervical back: Neck supple.   Skin:     General: Skin is warm and dry.   Neurological:      General: No focal deficit present.      Mental Status: He is alert and oriented to person, place, and time.   Psychiatric:         Mood and Affect: Mood normal.         Behavior: Behavior normal.         Thought Content: Thought content normal.         Judgment: Judgment normal.         Diagnostic Data:  Lab Results (last 24 hours)       Procedure Component Value Units Date/Time    Extra Tubes [[id number]] Collected: 12/15/24 1016    Specimen: Blood, Venous Line Updated: 12/15/24 1101    Narrative:      The following orders were created for panel order Extra Tubes.  Procedure                               Abnormality         Status                     ---------                               -----------         ------                     [facility][[id number]]                                   Final result                 Please view results for these tests on the individual orders.    [facility] - [facility] [[id number]] Collected: 12/15/24 1016    Specimen: Blood Updated: 12/15/24 1101     Extra Tube Hold for add-ons.     Comment: Auto resulted.       Comprehensive Metabolic Panel [[id number]]  (Abnormal) Collected: 12/15/24 1016    Specimen: Blood Updated: 12/15/24 1046     Glucose 106 mg/dL      BUN 9  mg/dL      Creatinine 0.94 mg/dL      Sodium 138 mmol/L      Potassium 3.9 mmol/L      Chloride 103 mmol/L      CO2 24.8 mmol/L      Calcium 9.2 mg/dL      Total Protein 7.0 g/dL      Albumin 3.9 g/dL      ALT (SGPT) 16 U/L      AST (SGOT) 24 U/L      Alkaline Phosphatase 73 U/L      Total Bilirubin 0.6 mg/dL      Globulin 3.1 gm/dL      A/G Ratio 1.3 g/dL      BUN/Creatinine Ratio 9.6     Anion Gap 10.2 mmol/L      eGFR 98.1 mL/min/1.73     Narrative:      GFR Categories in Chronic Kidney Disease (CKD)      GFR Category          GFR (mL/min/1.73)    Interpretation  G1                     90 or greater         Normal or high (1)  G2                      60-89                Mild decrease (1)  G3a                   45-59                Mild to moderate decrease  G3b                   30-44                Moderate to severe decrease  G4                    15-29                Severe decrease  G5                    14 or less           Kidney failure          (1)In the absence of evidence of kidney disease, neither GFR category G1 or G2 fulfill the criteria for CKD.    eGFR calculation 2021 CKD-EPI creatinine equation, which does not include race as a factor    Lipase [[id number]]  (Abnormal) Collected: 12/15/24 1016    Specimen: Blood Updated: 12/15/24 1046     Lipase 83 U/L     Urinalysis With Culture If Indicated - Urine, Clean Catch [[id number]]  (Normal) Collected: 12/15/24 1024    Specimen: Urine, Clean Catch Updated: 12/15/24 1035     Color, UA Yellow     Appearance, UA Clear     pH, UA 6.5     Specific Gravity, UA 1.019     Glucose, UA Negative     Ketones, UA Negative     Bilirubin, UA Negative     Blood, UA Negative     Protein, UA Negative     Leuk Esterase, UA Negative     Nitrite, UA Negative     Urobilinogen, UA 0.2 E.U./dL    Narrative:      In absence of clinical symptoms, the presence of pyuria, bacteria, and/or nitrites on the urinalysis result does not correlate with infection.  Urine microscopic not  indicated.    CBC & Differential [[id number]]  (Abnormal) Collected: 12/15/24 1016    Specimen: Blood Updated: 12/15/24 1027    Narrative:      The following orders were created for panel order CBC & Differential.  Procedure                               Abnormality         Status                     ---------                               -----------         ------                     CBC Auto Differential[[id number]]        Abnormal            Final result                 Please view results for these tests on the individual orders.    CBC Auto Differential [[id number]]  (Abnormal) Collected: 12/15/24 1016    Specimen: Blood Updated: 12/15/24 1027     WBC 11.93 10*3/mm3      RBC 5.08 10*6/mm3      Hemoglobin 14.4 g/dL      Hematocrit 45.9 %      MCV 90.4 fL      MCH 28.3 pg      MCHC 31.4 g/dL      RDW 13.2 %      RDW-SD 44.2 fl      MPV 10.3 fL      Platelets 200 10*3/mm3      Neutrophil % 77.2 %      Lymphocyte % 12.3 %      Monocyte % 9.2 %      Eosinophil % 0.7 %      Basophil % 0.3 %      Immature Grans % 0.3 %      Neutrophils, Absolute 9.21 10*3/mm3      Lymphocytes, Absolute 1.47 10*3/mm3      Monocytes, Absolute 1.10 10*3/mm3      Eosinophils, Absolute 0.08 10*3/mm3      Basophils, Absolute 0.04 10*3/mm3      Immature Grans, Absolute 0.03 10*3/mm3      nRBC 0.0 /100 WBC              Imaging Results (Last 24 Hours)       Procedure Component Value Units Date/Time    CT Abdomen Pelvis With Contrast [[id number]] Collected: 12/15/24 1134     Updated: 12/15/24 1141    Narrative:      CT ABDOMEN PELVIS W CONTRAST    Date of Exam: 12/15/2024 11:25 AM EST    Indication: Epigastric abdominal pain.    Comparison: None available.    Technique: Axial CT images were obtained of the abdomen and pelvis following the uneventful intravenous administration of iodinated contrast. Sagittal and coronal reconstructions were performed.  Automated exposure control and iterative reconstruction   methods were used.    FINDINGS:    Lung  bases: No masses. No consolidation.    Liver:No masses. No intrahepatic biliary ductal dilatation.    Spleen:No masses. No perisplenic hematoma.    Pancreas: Inflammation in the region of the pancreatic head or duodenal C-loop    Gallbladder and common bile duct:No evidence of cholelithiasis. No evidence of cholecystitis.    Adrenal glands:No adrenal masses    Kidneys and ureters:No kidney stones. No renal masses.No calculi present within the ureters. Normal caliber ureters.    Urinary bladder:No urinary bladder wall thickening. No bladder masses.    Small bowel:Normal caliber small bowel.    Large bowel:No diverticulosis or diverticulitis. No large bowel masses are appreciated    Appendix: Prior appendectomy    GENITOURINARY: Normal prostate    Ascites or pneumoperitoneum:None.    Adenopathy:None present    Osseous structures: The pubic bones are intact. The proximal femurs are intact. The hip joints are maintained. Degenerative changes of the lumbar spine.    Other findings: None      Impression:      Inflammation in the region of the pancreatic head or duodenal C-loop most concerning for early pancreatitis          Electronically Signed: Doug Gross MD    12/15/2024 11:39 AM EST    Workstation ID: [id number]              Assessment & Plan        This is a 49 y.o. male with:    Active and Resolved Problems  Active Hospital Problems    Diagnosis  POA    **Acute pancreatitis [K85.90]  Yes      Resolved Hospital Problems   No resolved problems to display.       Abdominal pain  Acute pancreatitis  - Possibly secondary to diet/triglycerides  - CT abd consistent with pancreatitis, see report above  - SIRS: does not meet SIRS criteria  - Lipase: 83  - WBC 11.93  - AM lipid panel ordered  - Fluid: NS @ 125 mL/hr  - Pain control: dilaudid 0.5 mg Q2H PRN  - Antinausea: zofran 4 mg Q6H PRN  - GI prophylaxis: protonix 40 IV daily  - VTE prophylaxis: enoxaparin 40 mg daily  - Nutrition: NPO while on IV pain medication, having  N/V  - Continuous pulse ox, cardiac monitor    Hypertension  - Start hydralazine 10 mg IV PRN SBP >160, DBP >100 while NPO  - Continue home medications once off strict NPO  - Monitor BP per order    Asymptomatic bradycardia  - HR 50s, now in 60s  - EKG ordered  - Continuous cardiac monitor      VTE Prophylaxis:  Pharmacologic & mechanical VTE prophylaxis orders are present.        The patient desires to be as follows:    CODE STATUS:    Level Of Support Discussed With: Patient  Code Status (Patient has no pulse and is not breathing): CPR (Attempt to Resuscitate)  Medical Interventions (Patient has pulse or is breathing): Full Support        Estrella Thompson, spouse, who can be contacted at [phone], is the designated person to make medical decisions on the patient's behalf if He is incapable of doing so. This was clarified with patient and/or next of kin on 12/15/2024 during the course of this H&P.    Admission Status:  I believe this patient meets inpatient status.    Expected Length of Stay: 1-2 days    PDMP and Medication Dispenses via Sidebar reviewed and consistent with patient reported medications.    I discussed the patient's findings and my recommendations with patient.      Signature:     This document has been electronically signed by GURVINDER Burger on December 15, 2024 15:28 [facility] Hospitalist Team     Electronically signed by Arabella Villagran MD at 12/15/24 1926          Emergency Department Notes        Lopez Peña, RN at 12/15/24 1715          Nursing report ED to floor  Guille Thompson  49 y.o.  male    HPI:   Chief Complaint   Patient presents with    Abdominal Pain     Abd pain all over,  pain started 2 days ago,  pt states he overate on Friday and that flared it up.  Pt has had some soft stools.         Admitting doctor:   Arabella Villagran MD    Admitting diagnosis:   The primary encounter diagnosis was Epigastric abdominal pain. Diagnoses of Nausea and Acute pancreatitis without  infection or necrosis, unspecified pancreatitis type were also pertinent to this visit.    Code status:   Current Code Status       Date Active Code Status Order ID Comments User Context       12/15/2024 1528 CPR (Attempt to Resuscitate) [id number]  Krystal Gilbert APRN ED        Question Answer    Code Status (Patient has no pulse and is not breathing) CPR (Attempt to Resuscitate)    Medical Interventions (Patient has pulse or is breathing) Full Support    Level Of Support Discussed With Patient                    Allergies:   Penicillins    Isolation:  No active isolations     Fall Risk:  Fall Risk Assessment was completed, and patient is at low risk for falls.   Predictive Model Details         5 (Low) Factor Value    Calculated 12/15/2024 17:11 Age 49    Risk of Fall Model Active Peripheral IV Present     Imaging order in this encounter Present     Number of Distinct Medication Classes administered 7     Magnesium not on file     Kash Scale not on file     Number of administrations of Anti-Rheumatics 1     Number of administrations of Ulcer Drugs 2     Diastolic BP 79     Clinically Relevant Sex Not Female     Albumin 3.9 g/dL     Cardiac Assessment X     Number of administrations of Analgesic Narcotics 1     Total Bilirubin 0.6 mg/dL     Calcium 9.2 mg/dL     Gastrointestinal Assessment X     Days after Admission 0.32     Chloride 103 mmol/L     Creatinine 0.94 mg/dL     Potassium 3.9 mmol/L     Respiratory Rate 15     ALT 16 U/L         Weight:       12/15/24  0929   Weight: 127 kg (279 lb 1.6 oz)       Intake and Output  No intake or output data in the 24 hours ending 12/15/24 1715    Diet:   Dietary Orders (From admission, onward)       Start     Ordered    12/15/24 1146  NPO Diet NPO Type: Strict NPO  Diet Effective Now        Question:  NPO Type  Answer:  Strict NPO    12/15/24 1145                     Most recent vitals:   Vitals:    12/15/24 1603 12/15/24 1639 12/15/24 1650 12/15/24 1702   BP: (!)  159/104 (!) 188/101 (!) 178/107 149/79   BP Location:       Patient Position:       Pulse: 62 66 67 60   Resp:       Temp:       TempSrc:       SpO2: 98% 100% 98% 94%   Weight:       Height:           Active LDAs/IV Access:   Lines, Drains & Airways       Active LDAs       Name Placement date Placement time Site Days    Peripheral IV 12/15/24 1108 Left Antecubital 12/15/24  1108  Antecubital  less than 1                    Skin Condition:   Skin Assessments (last day)       Date/Time Skin WDL    12/15/24 09:34:47 WDL             Labs (abnormal labs have a star):   Labs Reviewed   COMPREHENSIVE METABOLIC PANEL - Abnormal; Notable for the following components:       Result Value    Glucose 106 (*)     All other components within normal limits    Narrative:     GFR Categories in Chronic Kidney Disease (CKD)      GFR Category          GFR (mL/min/1.73)    Interpretation  G1                     90 or greater         Normal or high (1)  G2                      60-89                Mild decrease (1)  G3a                   45-59                Mild to moderate decrease  G3b                   30-44                Moderate to severe decrease  G4                    15-29                Severe decrease  G5                    14 or less           Kidney failure          (1)In the absence of evidence of kidney disease, neither GFR category G1 or G2 fulfill the criteria for CKD.    eGFR calculation 2021 CKD-EPI creatinine equation, which does not include race as a factor   LIPASE - Abnormal; Notable for the following components:    Lipase 83 (*)     All other components within normal limits   CBC WITH AUTO DIFFERENTIAL - Abnormal; Notable for the following components:    WBC 11.93 (*)     MCHC 31.4 (*)     Neutrophil % 77.2 (*)     Lymphocyte % 12.3 (*)     Neutrophils, Absolute 9.21 (*)     Monocytes, Absolute 1.10 (*)     All other components within normal limits   URINALYSIS W/ CULTURE IF INDICATED - Normal    Narrative:     In  absence of clinical symptoms, the presence of pyuria, bacteria, and/or nitrites on the urinalysis result does not correlate with infection.  Urine microscopic not indicated.   CBC AND DIFFERENTIAL    Narrative:     The following orders were created for panel order CBC & Differential.  Procedure                               Abnormality         Status                     ---------                               -----------         ------                     CBC Auto Differential[[id number]]        Abnormal            Final result                 Please view results for these tests on the individual orders.   EXTRA TUBES    Narrative:     The following orders were created for panel order Extra Tubes.  Procedure                               Abnormality         Status                     ---------                               -----------         ------                     Gold [facility] - [facility][[id number]]                                   Final result                 Please view results for these tests on the individual orders.   G[facility] - [facility]       LOC: Person, Place, Time, and Situation    Telemetry:  Telemetry    Cardiac Monitoring Ordered: yes      EKG:   ECG 12 Lead Bradycardia   Preliminary Result   HEART RATE=55  bpm   RR Tlixiudw=3658  ms   RI Swagtaer=850  ms   P Horizontal Axis=42  deg   P Front Axis=37  deg   QRSD Interval=94  ms   QT Xnzaczlw=135  ms   KAtV=100  ms   QRS Axis=1  deg   T Wave Axis=1  deg   - OTHERWISE NORMAL ECG -   Sinus bradycardia   Date and Time of Study:2024-12-15 16:46:33          Medications Given in the ED:   Medications   sodium chloride 0.9 % flush 10 mL (10 mL Intravenous Given 12/15/24 1517)   sodium chloride 0.9 % flush 10 mL (has no administration in time range)   sodium chloride 0.9 % infusion 40 mL (has no administration in time range)   nitroglycerin (NITROSTAT) SL tablet 0.4 mg (has no administration in time range)   Potassium Replacement - Follow Nurse / BPA Driven Protocol (has  no administration in time range)   Magnesium Standard Dose Replacement - Follow Nurse / BPA Driven Protocol (has no administration in time range)   Phosphorus Replacement - Follow Nurse / BPA Driven Protocol (has no administration in time range)   Calcium Replacement - Follow Nurse / BPA Driven Protocol (has no administration in time range)   sennosides-docusate (PERICOLACE) 8.6-50 MG per tablet 2 tablet (has no administration in time range)     And   polyethylene glycol (MIRALAX) packet 17 g (has no administration in time range)     And   bisacodyl (DULCOLAX) EC tablet 5 mg (has no administration in time range)     And   bisacodyl (DULCOLAX) suppository 10 mg (has no administration in time range)   ondansetron ODT (ZOFRAN-ODT) disintegrating tablet 4 mg ( Oral Not Given:  See Alt 12/15/24 1637)     Or   ondansetron (ZOFRAN) injection 4 mg (4 mg Intravenous Given 12/15/24 1637)   pantoprazole (PROTONIX) injection 40 mg (40 mg Intravenous Given 12/15/24 1517)   sodium chloride 0.9 % infusion (125 mL/hr Intravenous New Bag 12/15/24 1517)   HYDROmorphone (DILAUDID) injection 0.5 mg (0.5 mg Intravenous Given 12/15/24 1638)   Enoxaparin Sodium (LOVENOX) syringe 40 mg (has no administration in time range)   hydrALAZINE (APRESOLINE) injection 10 mg (has no administration in time range)   ondansetron ODT (ZOFRAN-ODT) disintegrating tablet 4 mg (4 mg Oral Given 12/15/24 1010)   dicyclomine (BENTYL) capsule 20 mg (20 mg Oral Given 12/15/24 1010)   ketorolac (TORADOL) injection 15 mg (15 mg Intravenous Given 12/15/24 1146)   prochlorperazine (COMPAZINE) injection 10 mg (10 mg Intravenous Given 12/15/24 1146)   iopamidol (ISOVUE-370) 76 % injection 100 mL (100 mL Intravenous Given 12/15/24 1132)       Imaging results:  CT Abdomen Pelvis With Contrast    Result Date: 12/15/2024  Inflammation in the region of the pancreatic head or duodenal C-loop most concerning for early pancreatitis Electronically Signed: Doug Gross MD   12/15/2024 11:39 AM EST  Workstation ID: [id number]     Social issues:   Social History     Socioeconomic History    Marital status:    Tobacco Use    Smoking status: Never    Smokeless tobacco: Never   Vaping Use    Vaping status: Never Used   Substance and Sexual Activity    Alcohol use: Yes     Comment: social    Drug use: Never       NIH Stroke Scale:  Interval: (not recorded)  1a. Level of Consciousness: (not recorded)  1b. LOC Questions: (not recorded)  1c. LOC Commands: (not recorded)  2. Best Gaze: (not recorded)  3. Visual: (not recorded)  4. Facial Palsy: (not recorded)  5a. Motor Arm, Left: (not recorded)  5b. Motor Arm, Right: (not recorded)  6a. Motor Leg, Left: (not recorded)  6b. Motor Leg, Right: (not recorded)  7. Limb Ataxia: (not recorded)  8. Sensory: (not recorded)  9. Best Language: (not recorded)  10. Dysarthria: (not recorded)  11. Extinction and Inattention (formerly Neglect): (not recorded)    Total (NIH Stroke Scale): (not recorded)     Additional notable assessment information:     Nursing report ED to floor:  BARBARA Navarro RN   12/15/24 17:15 EST     Electronically signed by Lopez Peña RN at 12/15/24 1716       Sowmya Soriano PA-C at 12/15/24 1031          Subjective   History of Present Illness  Patient is a 49-year-old male presenting to the ED for abdominal pain worsening over the past 2 days.  Patient states he went out of town last week, states when he came back he was having left-sided flank pain radiating into his groin and slight pain with urination, he does have a history of right-sided kidney stones.  States this pain has resolved.  2 days ago he went out to dinner, felt like he over ate and throughout the night felt bloated and had increased burping.  He tried using Tums and Pepto-Bismol with only slight improvement in symptoms.  He said last night and this morning he woke up with increasing pain in his get the gastric area that is a constant 5 out of 10  with intermittent radiation throughout his abdomen rating it an 8 out of 10.  He reports intermittent nausea and chills.  Denies any vomiting, fever, chest pain, dyspnea, dysuria, hematuria.  He reports a small bowel movement this morning, states he has been having trouble with irregular bowel movements.  Saw Dr. Christie GI doctor on Tuesday who plans to do an EGD and colonoscopy in a month.        Review of Systems   Constitutional:  Positive for chills. Negative for fever.   Respiratory:  Negative for shortness of breath.    Cardiovascular:  Negative for chest pain.   Gastrointestinal:  Positive for abdominal distention, abdominal pain and nausea. Negative for vomiting.   Genitourinary:  Negative for dysuria and hematuria.       Past Medical History:   Diagnosis Date    Hypertension     Sleep apnea        Allergies   Allergen Reactions    Penicillins Rash       Past Surgical History:   Procedure Laterality Date    APPENDECTOMY      LASIK         Family History   Problem Relation Age of Onset    Heart disease Father        Social History     Socioeconomic History    Marital status:    Tobacco Use    Smoking status: Never    Smokeless tobacco: Never   Vaping Use    Vaping status: Never Used   Substance and Sexual Activity    Alcohol use: Yes     Comment: social    Drug use: Never           Objective   Physical Exam  Constitutional:       Appearance: He is well-developed.   HENT:      Head: Normocephalic and atraumatic.   Eyes:      Extraocular Movements: Extraocular movements intact.   Cardiovascular:      Rate and Rhythm: Normal rate and regular rhythm.      Heart sounds: Normal heart sounds.   Pulmonary:      Effort: Pulmonary effort is normal.      Breath sounds: Normal breath sounds.   Abdominal:      General: Bowel sounds are normal. There is distension.      Palpations: Abdomen is soft.      Tenderness: There is generalized abdominal tenderness and tenderness in the epigastric area.   Musculoskeletal:     "     General: No tenderness. Normal range of motion.      Cervical back: Normal range of motion.      Right lower leg: No edema.      Left lower leg: No edema.   Skin:     General: Skin is warm and dry.      Capillary Refill: Capillary refill takes less than 2 seconds.   Neurological:      General: No focal deficit present.      Mental Status: He is alert and oriented to person, place, and time.   Psychiatric:         Mood and Affect: Mood normal.         Behavior: Behavior normal.         Procedures          ED Course  ED Course as of 12/15/24 1225   Sun Dec 15, 2024   1222 Spoke with Krystal MANZANARES with hospitalist group who agrees admit patient. [EC]      ED Course User Index  [EC] Sowmya Soriano PA-C      /94   Pulse 54   Temp 97.6 °F (36.4 °C) (Oral)   Resp 15   Ht 177.8 cm (70\")   Wt 127 kg (279 lb 1.6 oz)   SpO2 97%   BMI 40.05 kg/m²   Labs Reviewed   COMPREHENSIVE METABOLIC PANEL - Abnormal; Notable for the following components:       Result Value    Glucose 106 (*)     All other components within normal limits    Narrative:     GFR Categories in Chronic Kidney Disease (CKD)      GFR Category          GFR (mL/min/1.73)    Interpretation  G1                     90 or greater         Normal or high (1)  G2                      60-89                Mild decrease (1)  G3a                   45-59                Mild to moderate decrease  G3b                   30-44                Moderate to severe decrease  G4                    15-29                Severe decrease  G5                    14 or less           Kidney failure          (1)In the absence of evidence of kidney disease, neither GFR category G1 or G2 fulfill the criteria for CKD.    eGFR calculation 2021 CKD-EPI creatinine equation, which does not include race as a factor   LIPASE - Abnormal; Notable for the following components:    Lipase 83 (*)     All other components within normal limits   CBC WITH AUTO DIFFERENTIAL - Abnormal; Notable for " the following components:    WBC 11.93 (*)     MCHC 31.4 (*)     Neutrophil % 77.2 (*)     Lymphocyte % 12.3 (*)     Neutrophils, Absolute 9.21 (*)     Monocytes, Absolute 1.10 (*)     All other components within normal limits   URINALYSIS W/ CULTURE IF INDICATED - Normal    Narrative:     In absence of clinical symptoms, the presence of pyuria, bacteria, and/or nitrites on the urinalysis result does not correlate with infection.  Urine microscopic not indicated.   CBC AND DIFFERENTIAL    Narrative:     The following orders were created for panel order CBC & Differential.  Procedure                               Abnormality         Status                     ---------                               -----------         ------                     CBC Auto Differential[[id number]]        Abnormal            Final result                 Please view results for these tests on the individual orders.   EXTRA TUBES    Narrative:     The following orders were created for panel order Extra Tubes.  Procedure                               Abnormality         Status                     ---------                               -----------         ------                     Gold Top - SST[[id number]]                                   Final result                 Please view results for these tests on the individual orders.   GOLD TOP - SST     Medications   ondansetron ODT (ZOFRAN-ODT) disintegrating tablet 4 mg (4 mg Oral Given 12/15/24 1010)   dicyclomine (BENTYL) capsule 20 mg (20 mg Oral Given 12/15/24 1010)   ketorolac (TORADOL) injection 15 mg (15 mg Intravenous Given 12/15/24 1146)   prochlorperazine (COMPAZINE) injection 10 mg (10 mg Intravenous Given 12/15/24 1146)   iopamidol (ISOVUE-370) 76 % injection 100 mL (100 mL Intravenous Given 12/15/24 1132)     CT Abdomen Pelvis With Contrast   Final Result   Inflammation in the region of the pancreatic head or duodenal C-loop most concerning for early pancreatitis                Electronically Signed: Doug Gross MD     12/15/2024 11:39 AM EST     Workstation ID: [id number]                                                           Medical Decision Making  Patient presented to the ED for the above complaint.    Patient underwent the above exam and evaluation.    While in the ED patient was placed in a gown and IV was established.  CT abdomen pelvis and blood work was obtained to assess for pancreatitis, cholecystitis, obstruction, electrolyte abnormality, dehydration.  Patient was given IV pain medicine and Zofran for symptoms.  Upon reevaluation patient is resting comfortably, reporting continuing pain.  Discussed findings with patient.  Educated him that we will be admitting him to the hospital due to early signs of pancreatitis on the CT and making him n.p.o.  Spoke with Krystal MANZANARES with hospitalist group who agreed to admit patient.  Patient voiced understanding, agreeable with dispo plan.    Labs were independently interpreted by myself and deemed remarkable for the following: WBC 11.93, glucose 106, lipase 83, urinalysis negative for infection.  CT abdomen and pelvis independently interpreted by radiologist and reviewed by myself showing: Inflammation in region of pancreatic head or duodenal C-loop concerning for early pancreatitis.    Appropriate PPE was worn during each patient encounter.    Based on clinical findings anticipate this patient will require 2 midnight stay.      Problems Addressed:  Acute pancreatitis without infection or necrosis, unspecified pancreatitis type: complicated acute illness or injury  Epigastric abdominal pain: complicated acute illness or injury  Nausea: complicated acute illness or injury    Amount and/or Complexity of Data Reviewed  Labs: ordered.  Radiology: ordered.    Risk  Prescription drug management.        Final diagnoses:   Epigastric abdominal pain   Nausea   Acute pancreatitis without infection or necrosis, unspecified pancreatitis type       ED  Disposition  ED Disposition       ED Disposition   Decision to Admit    Condition   --    Comment   Level of Care: Telemetry [5]   Diagnosis: Acute pancreatitis [577.0.ICD-9-CM]   Admitting Physician: BARRERA POWELL [[id number]]   Certification: I Certify That Inpatient Hospital Services Are Medically Necessary For Greater Than 2 Midnights                 No follow-up provider specified.       Medication List      No changes were made to your prescriptions during this visit.            Sowmya Soriano PA-C  12/15/24 1225      Electronically signed by Sowmya Soriano PA-C at 12/15/24 1228

## 2024-12-17 LAB
ALBUMIN SERPL-MCNC: 3.8 G/DL (ref 3.5–5.2)
ALBUMIN/GLOB SERPL: 1.2 G/DL
ALP SERPL-CCNC: 79 U/L (ref 39–117)
ALT SERPL W P-5'-P-CCNC: 18 U/L (ref 1–41)
ANION GAP SERPL CALCULATED.3IONS-SCNC: 10.8 MMOL/L (ref 5–15)
AST SERPL-CCNC: 21 U/L (ref 1–40)
BASOPHILS # BLD AUTO: 0.05 10*3/MM3 (ref 0–0.2)
BASOPHILS NFR BLD AUTO: 0.3 % (ref 0–1.5)
BILIRUB SERPL-MCNC: 1.2 MG/DL (ref 0–1.2)
BUN SERPL-MCNC: 7 MG/DL (ref 6–20)
BUN/CREAT SERPL: 7.9 (ref 7–25)
CALCIUM SPEC-SCNC: 9.2 MG/DL (ref 8.6–10.5)
CHLORIDE SERPL-SCNC: 101 MMOL/L (ref 98–107)
CO2 SERPL-SCNC: 25.2 MMOL/L (ref 22–29)
CREAT SERPL-MCNC: 0.89 MG/DL (ref 0.76–1.27)
DEPRECATED RDW RBC AUTO: 45.1 FL (ref 37–54)
EGFRCR SERPLBLD CKD-EPI 2021: 103.8 ML/MIN/1.73
EOSINOPHIL # BLD AUTO: 0.09 10*3/MM3 (ref 0–0.4)
EOSINOPHIL NFR BLD AUTO: 0.6 % (ref 0.3–6.2)
ERYTHROCYTE [DISTWIDTH] IN BLOOD BY AUTOMATED COUNT: 13.3 % (ref 12.3–15.4)
GLOBULIN UR ELPH-MCNC: 3.2 GM/DL
GLUCOSE SERPL-MCNC: 88 MG/DL (ref 65–99)
HCT VFR BLD AUTO: 46.2 % (ref 37.5–51)
HGB BLD-MCNC: 14.8 G/DL (ref 13–17.7)
IMM GRANULOCYTES # BLD AUTO: 0.08 10*3/MM3 (ref 0–0.05)
IMM GRANULOCYTES NFR BLD AUTO: 0.5 % (ref 0–0.5)
LYMPHOCYTES # BLD AUTO: 1.29 10*3/MM3 (ref 0.7–3.1)
LYMPHOCYTES NFR BLD AUTO: 8.6 % (ref 19.6–45.3)
MAGNESIUM SERPL-MCNC: 2 MG/DL (ref 1.6–2.6)
MCH RBC QN AUTO: 29.2 PG (ref 26.6–33)
MCHC RBC AUTO-ENTMCNC: 32 G/DL (ref 31.5–35.7)
MCV RBC AUTO: 91.3 FL (ref 79–97)
MONOCYTES # BLD AUTO: 1.61 10*3/MM3 (ref 0.1–0.9)
MONOCYTES NFR BLD AUTO: 10.8 % (ref 5–12)
NEUTROPHILS NFR BLD AUTO: 11.85 10*3/MM3 (ref 1.7–7)
NEUTROPHILS NFR BLD AUTO: 79.2 % (ref 42.7–76)
NRBC BLD AUTO-RTO: 0 /100 WBC (ref 0–0.2)
PHOSPHATE SERPL-MCNC: 2.4 MG/DL (ref 2.5–4.5)
PLATELET # BLD AUTO: 184 10*3/MM3 (ref 140–450)
PMV BLD AUTO: 10.3 FL (ref 6–12)
POTASSIUM SERPL-SCNC: 4 MMOL/L (ref 3.5–5.2)
PROT SERPL-MCNC: 7 G/DL (ref 6–8.5)
RBC # BLD AUTO: 5.06 10*6/MM3 (ref 4.14–5.8)
SODIUM SERPL-SCNC: 137 MMOL/L (ref 136–145)
WBC NRBC COR # BLD AUTO: 14.97 10*3/MM3 (ref 3.4–10.8)

## 2024-12-17 PROCEDURE — 25010000002 ENOXAPARIN PER 10 MG

## 2024-12-17 PROCEDURE — 84100 ASSAY OF PHOSPHORUS: CPT | Performed by: HOSPITALIST

## 2024-12-17 PROCEDURE — 85025 COMPLETE CBC W/AUTO DIFF WBC: CPT | Performed by: HOSPITALIST

## 2024-12-17 PROCEDURE — 80053 COMPREHEN METABOLIC PANEL: CPT | Performed by: HOSPITALIST

## 2024-12-17 PROCEDURE — 63710000001 ONDANSETRON ODT 4 MG TABLET DISPERSIBLE

## 2024-12-17 PROCEDURE — 83735 ASSAY OF MAGNESIUM: CPT | Performed by: HOSPITALIST

## 2024-12-17 PROCEDURE — 25810000003 LACTATED RINGERS PER 1000 ML: Performed by: HOSPITALIST

## 2024-12-17 RX ORDER — ACETAMINOPHEN 500 MG
1000 TABLET ORAL EVERY 8 HOURS PRN
Status: DISCONTINUED | OUTPATIENT
Start: 2024-12-17 | End: 2024-12-18 | Stop reason: HOSPADM

## 2024-12-17 RX ADMIN — ENOXAPARIN SODIUM 40 MG: 100 INJECTION SUBCUTANEOUS at 20:38

## 2024-12-17 RX ADMIN — PANTOPRAZOLE SODIUM 40 MG: 40 INJECTION, POWDER, FOR SOLUTION INTRAVENOUS at 08:16

## 2024-12-17 RX ADMIN — Medication 10 ML: at 08:16

## 2024-12-17 RX ADMIN — BISACODYL 5 MG: 5 TABLET, COATED ORAL at 04:02

## 2024-12-17 RX ADMIN — SODIUM CHLORIDE, SODIUM LACTATE, POTASSIUM CHLORIDE, CALCIUM CHLORIDE 150 ML/HR: 20; 30; 600; 310 INJECTION, SOLUTION INTRAVENOUS at 02:29

## 2024-12-17 RX ADMIN — OXYCODONE 5 MG: 5 TABLET ORAL at 21:58

## 2024-12-17 RX ADMIN — OXYCODONE 5 MG: 5 TABLET ORAL at 02:27

## 2024-12-17 RX ADMIN — Medication 10 ML: at 20:38

## 2024-12-17 RX ADMIN — ENOXAPARIN SODIUM 40 MG: 100 INJECTION SUBCUTANEOUS at 08:16

## 2024-12-17 RX ADMIN — ACETAMINOPHEN 1000 MG: 500 TABLET, FILM COATED ORAL at 12:12

## 2024-12-17 RX ADMIN — METOPROLOL SUCCINATE 25 MG: 25 TABLET, EXTENDED RELEASE ORAL at 08:24

## 2024-12-17 RX ADMIN — ONDANSETRON 4 MG: 4 TABLET, ORALLY DISINTEGRATING ORAL at 21:58

## 2024-12-17 RX ADMIN — SODIUM CHLORIDE, SODIUM LACTATE, POTASSIUM CHLORIDE, CALCIUM CHLORIDE 150 ML/HR: 20; 30; 600; 310 INJECTION, SOLUTION INTRAVENOUS at 09:42

## 2024-12-17 NOTE — PLAN OF CARE
Goal Outcome Evaluation:   Patients pain has came and gone but has not gone over a 6 because of being proactive about alerting for medication before it became too bad. Will continue to monitor.

## 2024-12-17 NOTE — PROGRESS NOTES
[facility] MEDICINE SERVICE  DAILY PROGRESS NOTE    NAME: Guille Thompson  : 1975  MRN: [id number]      LOS: 2 days     PROVIDER OF SERVICE: Parker Shah MD    Chief Complaint: Acute pancreatitis    Subjective:     Interval History:  History taken from: patient chart RN    Abdominal pain improved  No nausea vomiting      Review of Systems:   Review of Systems  All negative except as above  Objective:     Vital Signs  Temp:  [98.2 °F (36.8 °C)-99.1 °F (37.3 °C)] 98.2 °F (36.8 °C)  Heart Rate:  [68-87] 82  Resp:  [11-18] 16  BP: (137-170)/(80-94) 137/80   Body mass index is 40.05 kg/m².    Physical Exam  Physical Exam  AOx3 NAD  RRR S1-S2 audible  Lungs with fair air entry  Abdomen soft nontender nondistended          Diagnostic Data    Results from last 7 days   Lab Units 24  0145   WBC 10*3/mm3 14.97*   HEMOGLOBIN g/dL 14.8   HEMATOCRIT % 46.2   PLATELETS 10*3/mm3 184   GLUCOSE mg/dL 88   CREATININE mg/dL 0.89   BUN mg/dL 7   SODIUM mmol/L 137   POTASSIUM mmol/L 4.0   AST (SGOT) U/L 21   ALT (SGPT) U/L 18   ALK PHOS U/L 79   BILIRUBIN mg/dL 1.2   ANION GAP mmol/L 10.8       No radiology results for the last day      I reviewed the patient's new clinical results.  I reviewed the patient's new imaging results and agree with the interpretation.    Assessment/Plan:     Active and Resolved Problems  Active Hospital Problems    Diagnosis  POA    **Acute pancreatitis [K85.90]  Yes      Resolved Hospital Problems   No resolved problems to display.       49-year-old male with history of hypertension, sleep apnea not on CPAP, morbid obesity, admitted to [facility] 12/15 with abdominal pain        #Acute pancreatitis  Reports having 1-2 alcoholic drinks in a year.  However did start taking over-the-counter herbal supplements which may have contributed to pancreatitis     Started on clear liquid diet advance as tolerated   continue IV fluids LR  oxycodone as needed for moderate pain.  Increase  IV Dilaudid as needed for severe pain  Discontinue herbal supplements  U-Tox positive for opioid and oxycodone.  These medications given during his hospitalization prior to U-Tox continue  Monitor leukocytosis suspect reactive     #Hypertension  Continue Toprol  IV hydralazine for SBP more than 160    VTE Prophylaxis:  Pharmacologic & mechanical VTE prophylaxis orders are present.             Disposition Planning:     Barriers to Discharge:medical workup   Anticipated Date of Discharge: 12/18  Place of Discharge: Home      Time: 40 minutes     Code Status and Medical Interventions: CPR (Attempt to Resuscitate); Full Support   Ordered at: 12/15/24 1528     Level Of Support Discussed With:    Patient     Code Status (Patient has no pulse and is not breathing):    CPR (Attempt to Resuscitate)     Medical Interventions (Patient has pulse or is breathing):    Full Support       Signature: Electronically signed by Parker Shah MD, 12/17/24, 12:13 [facility].  [facility] ([facility] Hospitalist Team

## 2024-12-17 NOTE — PLAN OF CARE
Goal Outcome Evaluation:  Plan of Care Reviewed With: patient        Progress: improving  Outcome Evaluation: Patient has been resting well throughout the shift without any complaints of pain. Remains on LR running at 150mL/hr. Started on clear liquid diet today. No other issues at this time. Continuing to monitor.

## 2024-12-18 ENCOUNTER — READMISSION MANAGEMENT (OUTPATIENT)
Dept: CALL CENTER | Facility: HOSPITAL | Age: 49
End: 2024-12-18
Payer: COMMERCIAL

## 2024-12-18 VITALS
BODY MASS INDEX: 39.96 KG/M2 | SYSTOLIC BLOOD PRESSURE: 159 MMHG | OXYGEN SATURATION: 97 % | TEMPERATURE: 98 F | DIASTOLIC BLOOD PRESSURE: 94 MMHG | WEIGHT: 279.1 LBS | HEIGHT: 70 IN | HEART RATE: 57 BPM | RESPIRATION RATE: 14 BRPM

## 2024-12-18 LAB
ALBUMIN SERPL-MCNC: 3.5 G/DL (ref 3.5–5.2)
ALBUMIN/GLOB SERPL: 1.2 G/DL
ALP SERPL-CCNC: 70 U/L (ref 39–117)
ALT SERPL W P-5'-P-CCNC: 16 U/L (ref 1–41)
ANION GAP SERPL CALCULATED.3IONS-SCNC: 9.1 MMOL/L (ref 5–15)
AST SERPL-CCNC: 21 U/L (ref 1–40)
BASOPHILS # BLD AUTO: 0.04 10*3/MM3 (ref 0–0.2)
BASOPHILS NFR BLD AUTO: 0.4 % (ref 0–1.5)
BILIRUB SERPL-MCNC: 0.6 MG/DL (ref 0–1.2)
BUN SERPL-MCNC: 8 MG/DL (ref 6–20)
BUN/CREAT SERPL: 10.4 (ref 7–25)
CALCIUM SPEC-SCNC: 8.7 MG/DL (ref 8.6–10.5)
CHLORIDE SERPL-SCNC: 102 MMOL/L (ref 98–107)
CO2 SERPL-SCNC: 23.9 MMOL/L (ref 22–29)
CREAT SERPL-MCNC: 0.77 MG/DL (ref 0.76–1.27)
DEPRECATED RDW RBC AUTO: 42.9 FL (ref 37–54)
EGFRCR SERPLBLD CKD-EPI 2021: 108.4 ML/MIN/1.73
EOSINOPHIL # BLD AUTO: 0.2 10*3/MM3 (ref 0–0.4)
EOSINOPHIL NFR BLD AUTO: 1.9 % (ref 0.3–6.2)
ERYTHROCYTE [DISTWIDTH] IN BLOOD BY AUTOMATED COUNT: 13.1 % (ref 12.3–15.4)
GLOBULIN UR ELPH-MCNC: 2.9 GM/DL
GLUCOSE SERPL-MCNC: 84 MG/DL (ref 65–99)
HCT VFR BLD AUTO: 41.6 % (ref 37.5–51)
HGB BLD-MCNC: 13.6 G/DL (ref 13–17.7)
IMM GRANULOCYTES # BLD AUTO: 0.06 10*3/MM3 (ref 0–0.05)
IMM GRANULOCYTES NFR BLD AUTO: 0.6 % (ref 0–0.5)
LYMPHOCYTES # BLD AUTO: 1.57 10*3/MM3 (ref 0.7–3.1)
LYMPHOCYTES NFR BLD AUTO: 15.3 % (ref 19.6–45.3)
MAGNESIUM SERPL-MCNC: 2 MG/DL (ref 1.6–2.6)
MCH RBC QN AUTO: 29.1 PG (ref 26.6–33)
MCHC RBC AUTO-ENTMCNC: 32.7 G/DL (ref 31.5–35.7)
MCV RBC AUTO: 88.9 FL (ref 79–97)
MONOCYTES # BLD AUTO: 1.06 10*3/MM3 (ref 0.1–0.9)
MONOCYTES NFR BLD AUTO: 10.3 % (ref 5–12)
NEUTROPHILS NFR BLD AUTO: 7.35 10*3/MM3 (ref 1.7–7)
NEUTROPHILS NFR BLD AUTO: 71.5 % (ref 42.7–76)
NRBC BLD AUTO-RTO: 0 /100 WBC (ref 0–0.2)
PHOSPHATE SERPL-MCNC: 2.5 MG/DL (ref 2.5–4.5)
PLATELET # BLD AUTO: 182 10*3/MM3 (ref 140–450)
PMV BLD AUTO: 10.5 FL (ref 6–12)
POTASSIUM SERPL-SCNC: 3.6 MMOL/L (ref 3.5–5.2)
PROT SERPL-MCNC: 6.4 G/DL (ref 6–8.5)
RBC # BLD AUTO: 4.68 10*6/MM3 (ref 4.14–5.8)
SODIUM SERPL-SCNC: 135 MMOL/L (ref 136–145)
WBC NRBC COR # BLD AUTO: 10.28 10*3/MM3 (ref 3.4–10.8)

## 2024-12-18 PROCEDURE — 80053 COMPREHEN METABOLIC PANEL: CPT | Performed by: HOSPITALIST

## 2024-12-18 PROCEDURE — 85025 COMPLETE CBC W/AUTO DIFF WBC: CPT | Performed by: HOSPITALIST

## 2024-12-18 PROCEDURE — 25010000002 ENOXAPARIN PER 10 MG

## 2024-12-18 PROCEDURE — 84100 ASSAY OF PHOSPHORUS: CPT | Performed by: HOSPITALIST

## 2024-12-18 PROCEDURE — 83735 ASSAY OF MAGNESIUM: CPT | Performed by: HOSPITALIST

## 2024-12-18 PROCEDURE — 25810000003 LACTATED RINGERS PER 1000 ML: Performed by: HOSPITALIST

## 2024-12-18 RX ORDER — POTASSIUM CHLORIDE 1500 MG/1
40 TABLET, EXTENDED RELEASE ORAL EVERY 4 HOURS
Status: COMPLETED | OUTPATIENT
Start: 2024-12-18 | End: 2024-12-18

## 2024-12-18 RX ORDER — ONDANSETRON 4 MG/1
4 TABLET, ORALLY DISINTEGRATING ORAL EVERY 6 HOURS PRN
Qty: 12 TABLET | Refills: 0 | Status: SHIPPED | OUTPATIENT
Start: 2024-12-18 | End: 2024-12-21

## 2024-12-18 RX ORDER — OXYCODONE HYDROCHLORIDE 5 MG/1
5 TABLET ORAL EVERY 6 HOURS PRN
Qty: 12 TABLET | Refills: 0 | Status: SHIPPED | OUTPATIENT
Start: 2024-12-18 | End: 2024-12-21

## 2024-12-18 RX ADMIN — SODIUM CHLORIDE, SODIUM LACTATE, POTASSIUM CHLORIDE, CALCIUM CHLORIDE 150 ML/HR: 20; 30; 600; 310 INJECTION, SOLUTION INTRAVENOUS at 04:46

## 2024-12-18 RX ADMIN — POTASSIUM CHLORIDE 40 MEQ: 1500 TABLET, EXTENDED RELEASE ORAL at 07:25

## 2024-12-18 RX ADMIN — PANTOPRAZOLE SODIUM 40 MG: 40 INJECTION, POWDER, FOR SOLUTION INTRAVENOUS at 07:27

## 2024-12-18 RX ADMIN — POTASSIUM CHLORIDE 40 MEQ: 1500 TABLET, EXTENDED RELEASE ORAL at 10:56

## 2024-12-18 RX ADMIN — METOPROLOL SUCCINATE 25 MG: 25 TABLET, EXTENDED RELEASE ORAL at 07:26

## 2024-12-18 RX ADMIN — Medication 10 ML: at 07:27

## 2024-12-18 RX ADMIN — ENOXAPARIN SODIUM 40 MG: 100 INJECTION SUBCUTANEOUS at 07:26

## 2024-12-18 NOTE — PLAN OF CARE
Goal Outcome Evaluation:  Plan of Care Reviewed With: patient        Progress: improving    Problem: Adult Inpatient Plan of Care  Goal: Plan of Care Review  Outcome: Met  Goal: Patient-Specific Goal (Individualized)  Outcome: Met  Goal: Absence of Hospital-Acquired Illness or Injury  Outcome: Met  Intervention: Identify and Manage Fall Risk  Recent Flowsheet Documentation  Taken 12/18/2024 1200 by Becca Stanley RN  Safety Promotion/Fall Prevention:   safety round/check completed   room organization consistent   nonskid shoes/slippers when out of bed   lighting adjusted   clutter free environment maintained   assistive device/personal items within reach  Taken 12/18/2024 1000 by Becca Stanley RN  Safety Promotion/Fall Prevention:   safety round/check completed   room organization consistent   nonskid shoes/slippers when out of bed   lighting adjusted   clutter free environment maintained   assistive device/personal items within reach  Taken 12/18/2024 0800 by Becca Stanley RN  Safety Promotion/Fall Prevention:   safety round/check completed   room organization consistent   nonskid shoes/slippers when out of bed   lighting adjusted   clutter free environment maintained   assistive device/personal items within reach  Intervention: Prevent Skin Injury  Recent Flowsheet Documentation  Taken 12/18/2024 1200 by Becca Stanley RN  Body Position: position changed independently  Taken 12/18/2024 1000 by Becca Stanley RN  Body Position: position changed independently  Taken 12/18/2024 0800 by Becca Stanley RN  Body Position: position changed independently  Intervention: Prevent and Manage VTE (Venous Thromboembolism) Risk  Recent Flowsheet Documentation  Taken 12/18/2024 0800 by Becca Stanley RN  VTE Prevention/Management:   bilateral   SCDs (sequential compression devices) off  Intervention: Prevent Infection  Recent Flowsheet Documentation  Taken 12/18/2024 1200 by Becca Stanley RN  Infection  Prevention:   hand hygiene promoted   personal protective equipment utilized   rest/sleep promoted   single patient room provided  Taken 12/18/2024 1000 by Becca Stanley RN  Infection Prevention:   hand hygiene promoted   personal protective equipment utilized   rest/sleep promoted   single patient room provided  Taken 12/18/2024 0800 by Becca Stanley RN  Infection Prevention:   hand hygiene promoted   personal protective equipment utilized   rest/sleep promoted   single patient room provided  Goal: Optimal Comfort and Wellbeing  Outcome: Met  Intervention: Provide Person-Centered Care  Recent Flowsheet Documentation  Taken 12/18/2024 0800 by Becca Stanley RN  Trust Relationship/Rapport:   care explained   choices provided   emotional support provided   empathic listening provided   questions answered   reassurance provided   thoughts/feelings acknowledged  Goal: Readiness for Transition of Care  Outcome: Met     Problem: Comorbidity Management  Goal: Maintenance of COPD Symptom Control  Outcome: Met  Intervention: Maintain COPD (Chronic Obstructive Pulmonary Disease) Symptom Control  Recent Flowsheet Documentation  Taken 12/18/2024 1200 by Becca Stanley RN  Breathing Techniques/Airway Clearance: deep/controlled cough encouraged  Medication Review/Management: medications reviewed  Taken 12/18/2024 1000 by Becca Stanley RN  Medication Review/Management: medications reviewed  Taken 12/18/2024 0800 by Becca Stanley RN  Breathing Techniques/Airway Clearance: deep/controlled cough encouraged  Medication Review/Management: medications reviewed  Goal: Blood Glucose Level Within Target Range  Outcome: Met  Intervention: Monitor and Manage Glycemia  Recent Flowsheet Documentation  Taken 12/18/2024 1200 by Becca Stanley RN  Medication Review/Management: medications reviewed  Taken 12/18/2024 1000 by Becca Stanley RN  Medication Review/Management: medications reviewed  Taken 12/18/2024 0800 by Jaden  Becca CRYSTAL RN  Medication Review/Management: medications reviewed

## 2024-12-18 NOTE — CASE MANAGEMENT/SOCIAL WORK
Case Management Discharge Note      Final Note: Routine home.    Selected Continued Care - Discharged on 12/18/2024 Admission date: 12/15/2024 - Discharge disposition: Home or Self Care       Transportation Services  Private: Car    Final Discharge Disposition Code: 01 - home or self-care

## 2024-12-18 NOTE — DISCHARGE SUMMARY
[facility] Medicine Services  Discharge Summary    Date of Service: 2024  Patient Name: Guille Thompson  : 1975  MRN: [id number]    Date of Admission: 12/15/2024  Discharge Diagnosis: Acute pancreatitis  Date of Discharge: 2024  Primary Care Physician: Nicolle Hyde MD      Presenting Problem:   Acute pancreatitis [K85.90]  Nausea [R11.0]  Epigastric abdominal pain [R10.13]  Acute pancreatitis without infection or necrosis, unspecified pancreatitis type [K85.90]    Active and Resolved Hospital Problems:  Active Hospital Problems    Diagnosis POA    **Acute pancreatitis [K85.90] Yes      Resolved Hospital Problems   No resolved problems to display.         Hospital Course     HPI:  Admitting team HPI   Guille Thompson is a 49 y.o. male with a CMH of hypertension, sleep apnea not on CPAP, obesity who presented to [facility] on 12/15/2024 with abdominal pain for the past 2 days.  Patient reported he had left flank pain earlier in the week that resolved.  Patient reported 2 days ago he started to have epigastric pain, that progressively worsened with radiation throughout abdomen.     Hospital Course:  49-year-old male with history of hypertension, sleep apnea not on CPAP, morbid obesity, admitted to [facility] 12/15 with abdominal pain        #Acute pancreatitis  Reports having 1-2 alcoholic drinks in a year.  However did start taking over-the-counter herbal supplements which may have contributed to pancreatitis. Etiology of pancreatitis suspected to be drug induced      Started on clear liquid diet initially and gradually advanced to regular   S/p IVF   Discontinue herbal supplements  U-Tox positive for opioid and oxycodone.  These medications were given during his hospitalization prior to U-Tox   Natural course of pancreatitis discussed with patient   Counseled on etoh use cessation   Zofran PRN      #Hypertension  Continue Toprol      DISCHARGE Follow Up  Recommendations for labs and diagnostics:   Discontinue OTC herbal supplements           Day of Discharge     Vital Signs:  Temp:  [97.9 °F (36.6 °C)-98.5 °F (36.9 °C)] 98 °F (36.7 °C)  Heart Rate:  [57-71] 57  Resp:  [11-20] 14  BP: (144-163)/(81-96) 159/94    Physical Exam:  Physical Exam   AOx3 NAD  RRR S1-S2 audible  Lungs with fair air entry  Abdomen soft nontender nondistended          Pertinent  and/or Most Recent Results     LAB RESULTS:      Lab 12/18/24  0452 12/17/24  0145 12/16/24  0551 12/15/24  1016   WBC 10.28 14.97* 17.44* 11.93*   HEMOGLOBIN 13.6 14.8 14.6 14.4   HEMATOCRIT 41.6 46.2 45.9 45.9   PLATELETS 182 184 193 200   NEUTROS ABS 7.35* 11.85* 13.83* 9.21*   IMMATURE GRANS (ABS) 0.06* 0.08* 0.07* 0.03   LYMPHS ABS 1.57 1.29 1.19 1.47   MONOS ABS 1.06* 1.61* 2.21* 1.10*   EOS ABS 0.20 0.09 0.10 0.08   MCV 88.9 91.3 89.8 90.4         Lab 12/18/24  0452 12/17/24  0145 12/16/24  0551 12/15/24  1016   SODIUM 135* 137 136 138   POTASSIUM 3.6 4.0 3.7 3.9   CHLORIDE 102 101 102 103   CO2 23.9 25.2 23.0 24.8   ANION GAP 9.1 10.8 11.0 10.2   BUN 8 7 7 9   CREATININE 0.77 0.89 0.95 0.94   EGFR 108.4 103.8 96.9 98.1   GLUCOSE 84 88 105* 106*   CALCIUM 8.7 9.2 9.3 9.2   MAGNESIUM 2.0 2.0 1.9  --    PHOSPHORUS 2.5 2.4* 2.9  --          Lab 12/18/24  0452 12/17/24  0145 12/15/24  1016   TOTAL PROTEIN 6.4 7.0 7.0   ALBUMIN 3.5 3.8 3.9   GLOBULIN 2.9 3.2 3.1   ALT (SGPT) 16 18 16   AST (SGOT) 21 21 24   BILIRUBIN 0.6 1.2 0.6   ALK PHOS 70 79 73   LIPASE  --   --  83*             Lab 12/16/24  0551   CHOLESTEROL 142   LDL CHOL 74   HDL CHOL 58   TRIGLYCERIDES 46             Brief Urine Lab Results  (Last result in the past 365 days)        Color   Clarity   Blood   Leuk Est   Nitrite   Protein   CREAT   Urine HCG        12/15/24 1024 Yellow   Clear   Negative   Negative   Negative   Negative                 Microbiology Results (last 10 days)       ** No results found for the last 240 hours. **            CT  Abdomen Pelvis With Contrast    Result Date: 12/15/2024  Impression: Inflammation in the region of the pancreatic head or duodenal C-loop most concerning for early pancreatitis Electronically Signed: Doug Gross MD  12/15/2024 11:39 AM EST  Workstation ID: [id number]             Results for orders placed during the hospital encounter of 10/27/22    Adult Transthoracic Echo Complete W/ Cont if Necessary Per Protocol    Interpretation Summary    Left ventricular systolic function is normal. Left ventricular ejection fraction appears to be 56 - 60%.    The left ventricular cavity is borderline dilated.    Estimated right ventricular systolic pressure from tricuspid regurgitation is normal (<35 mmHg).      Labs Pending at Discharge:  Pending Results       Procedure [Order ID] Specimen - Date/Time    Potassium [[id number]]     Specimen: Blood             Procedures Performed           Consults:   Consults       Date and Time Order Name Status Description    12/15/2024 11:48 AM Hospitalist (on-call MD unless specified)                Discharge Details        Discharge Medications        New Medications        Instructions Start Date   ondansetron ODT 4 MG disintegrating tablet  Commonly known as: ZOFRAN-ODT   4 mg, Oral, Every 6 Hours PRN      oxyCODONE 5 MG immediate release tablet  Commonly known as: ROXICODONE   5 mg, Oral, Every 6 Hours PRN             Continue These Medications        Instructions Start Date   magnesium gluconate 500 MG tablet  Commonly known as: MAGONATE   27 mg, Daily      metoprolol succinate XL 25 MG 24 hr tablet  Commonly known as: TOPROL-XL   25 mg, Daily      multivitamin with minerals tablet tablet   1 tablet, Daily      omeprazole 40 MG capsule  Commonly known as: priLOSEC   40 mg, Daily      vitamin D3 125 MCG (5000 UT) capsule capsule   5,000 Units, 2 Times Daily               Allergies   Allergen Reactions    Penicillins Rash         Discharge Disposition:   Home or Self  Care    Diet:  Hospital:  Diet Order   Procedures    Diet: Gastrointestinal; Fiber-Restricted, Low Irritant; Texture: Soft to Chew (NDD 3); Soft to Chew: Whole Meat; Fluid Consistency: Thin (IDDSI 0)         Discharge Activity:         CODE STATUS:  Code Status and Medical Interventions: CPR (Attempt to Resuscitate); Full Support   Ordered at: 12/15/24 1528     Level Of Support Discussed With:    Patient     Code Status (Patient has no pulse and is not breathing):    CPR (Attempt to Resuscitate)     Medical Interventions (Patient has pulse or is breathing):    Full Support         No future appointments.        Time spent on Discharge including face to face service:  45 minutes    Signature: Electronically signed by Parker Shah MD, 12/18/24, 12:29 EST.  [facility] Hospitalist Team

## 2024-12-18 NOTE — PLAN OF CARE
Goal Outcome Evaluation:      Pt up ad pawan and actively walking unit with family at beginning of shift. Pt endorsed 1 episode of nausea, and zofran given and was effective. Pt had no new s/s or onset of new problems.

## 2024-12-19 NOTE — PAYOR COMM NOTE
"Guille Thompson \"KAMINI\" (49 y.o. Male)  1975  Ref #[id number]   DC Notice - Pt dc'd 24 Routine to home    ROGERS RODRIGUEZ LPN UR  Utilization Review Nurse  [facility]  Direct & confidential phone # [phone]  Fax # [phone]        Date of Birth   1975    Social Security Number       Address   [address] [place] [address] [place] [facility]    Home Phone   [phone]    MRN   [id number]       Restoration   None    Marital Status                               Admission Date   12/15/24    Admission Type   Emergency    Admitting Provider   Arabella Villagran MD    Attending Provider       Department, Room/Bed   [facility] MEDICAL INPATIENT, 359/       Discharge Date   2024    Discharge Disposition   Home or Self Care    Discharge Destination                                 Attending Provider: (none)   Allergies: Penicillins    Isolation: None   Infection: None   Code Status: Prior    Ht: 177.8 cm (70\")   Wt: 127 kg (279 lb 1.6 oz)    Admission Cmt: None   Principal Problem: Acute pancreatitis [K85.90]                   Active Insurance as of 12/15/2024       Primary Coverage       Payor Plan Insurance Group Employer/Plan Group    [id number]       Payor Plan Address Payor Plan Phone Number Payor Plan Fax Number Effective Dates    [address]   2008 - None Entered    [place] [id number]         Subscriber Name Subscriber Birth Date Member ID       GUILLE THOMPSON 1975 [id number]                     Emergency Contacts        (Rel.) Home Phone Work Phone Mobile Phone    NAVYA THOMPSON (Spouse) -- -- [phone]                 Discharge Summary        Parker Shah MD at [address] [place] [address] Medicine Services  Discharge Summary    Date of Service: 2024  Patient Name: Guille Thompson  : 1975  MRN: [id number]    Date of Admission: 12/15/2024  Discharge Diagnosis: " Acute pancreatitis  Date of Discharge: 12/18/2024  Primary Care Physician: Nicolle Hyde MD      Presenting Problem:   Acute pancreatitis [K85.90]  Nausea [R11.0]  Epigastric abdominal pain [R10.13]  Acute pancreatitis without infection or necrosis, unspecified pancreatitis type [K85.90]    Active and Resolved Hospital Problems:  Active Hospital Problems    Diagnosis POA    **Acute pancreatitis [K85.90] Yes      Resolved Hospital Problems   No resolved problems to display.         Hospital Course     HPI:  Admitting team HPI   Guille Thompson is a 49 y.o. male with a CMH of hypertension, sleep apnea not on CPAP, obesity who presented to [facility] on 12/15/2024 with abdominal pain for the past 2 days.  Patient reported he had left flank pain earlier in the week that resolved.  Patient reported 2 days ago he started to have epigastric pain, that progressively worsened with radiation throughout abdomen.     Hospital Course:  49-year-old male with history of hypertension, sleep apnea not on CPAP, morbid obesity, admitted to [facility] 12/15 with abdominal pain        #Acute pancreatitis  Reports having 1-2 alcoholic drinks in a year.  However did start taking over-the-counter herbal supplements which may have contributed to pancreatitis. Etiology of pancreatitis suspected to be drug induced      Started on clear liquid diet initially and gradually advanced to regular   S/p IVF   Discontinue herbal supplements  U-Tox positive for opioid and oxycodone.  These medications were given during his hospitalization prior to U-Tox   Natural course of pancreatitis discussed with patient   Counseled on etoh use cessation   Zofran PRN      #Hypertension  Continue Toprol      DISCHARGE Follow Up Recommendations for labs and diagnostics:   Discontinue OTC herbal supplements           Day of Discharge     Vital Signs:  Temp:  [97.9 °F (36.6 °C)-98.5 °F (36.9 °C)] 98 °F (36.7 °C)  Heart Rate:  [57-71] 57  Resp:   [11-20] 14  BP: (144-163)/(81-96) 159/94    Physical Exam:  Physical Exam   AOx3 NAD  RRR S1-S2 audible  Lungs with fair air entry  Abdomen soft nontender nondistended          Pertinent  and/or Most Recent Results     LAB RESULTS:      Lab 12/18/24  0452 12/17/24  0145 12/16/24  0551 12/15/24  1016   WBC 10.28 14.97* 17.44* 11.93*   HEMOGLOBIN 13.6 14.8 14.6 14.4   HEMATOCRIT 41.6 46.2 45.9 45.9   PLATELETS 182 184 193 200   NEUTROS ABS 7.35* 11.85* 13.83* 9.21*   IMMATURE GRANS (ABS) 0.06* 0.08* 0.07* 0.03   LYMPHS ABS 1.57 1.29 1.19 1.47   MONOS ABS 1.06* 1.61* 2.21* 1.10*   EOS ABS 0.20 0.09 0.10 0.08   MCV 88.9 91.3 89.8 90.4         Lab 12/18/24  0452 12/17/24  0145 12/16/24  0551 12/15/24  1016   SODIUM 135* 137 136 138   POTASSIUM 3.6 4.0 3.7 3.9   CHLORIDE 102 101 102 103   CO2 23.9 25.2 23.0 24.8   ANION GAP 9.1 10.8 11.0 10.2   BUN 8 7 7 9   CREATININE 0.77 0.89 0.95 0.94   EGFR 108.4 103.8 96.9 98.1   GLUCOSE 84 88 105* 106*   CALCIUM 8.7 9.2 9.3 9.2   MAGNESIUM 2.0 2.0 1.9  --    PHOSPHORUS 2.5 2.4* 2.9  --          Lab 12/18/24  0452 12/17/24  0145 12/15/24  1016   TOTAL PROTEIN 6.4 7.0 7.0   ALBUMIN 3.5 3.8 3.9   GLOBULIN 2.9 3.2 3.1   ALT (SGPT) 16 18 16   AST (SGOT) 21 21 24   BILIRUBIN 0.6 1.2 0.6   ALK PHOS 70 79 73   LIPASE  --   --  83*             Lab 12/16/24  0551   CHOLESTEROL 142   LDL CHOL 74   HDL CHOL 58   TRIGLYCERIDES 46             Brief Urine Lab Results  (Last result in the past 365 days)        Color   Clarity   Blood   Leuk Est   Nitrite   Protein   CREAT   Urine HCG        12/15/24 1024 Yellow   Clear   Negative   Negative   Negative   Negative                 Microbiology Results (last 10 days)       ** No results found for the last 240 hours. **            CT Abdomen Pelvis With Contrast    Result Date: 12/15/2024  Impression: Inflammation in the region of the pancreatic head or duodenal C-loop most concerning for early pancreatitis Electronically Signed: Doug Gross MD   12/15/2024 11:39 AM EST  Workstation ID: [id number]             Results for orders placed during the hospital encounter of 10/27/22    Adult Transthoracic Echo Complete W/ Cont if Necessary Per Protocol    Interpretation Summary    Left ventricular systolic function is normal. Left ventricular ejection fraction appears to be 56 - 60%.    The left ventricular cavity is borderline dilated.    Estimated right ventricular systolic pressure from tricuspid regurgitation is normal (<35 mmHg).      Labs Pending at Discharge:  Pending Results       Procedure [Order ID] Specimen - Date/Time    Potassium [[id number]]     Specimen: Blood             Procedures Performed           Consults:   Consults       Date and Time Order Name Status Description    12/15/2024 11:48 AM Hospitalist (on-call MD unless specified)                Discharge Details        Discharge Medications        New Medications        Instructions Start Date   ondansetron ODT 4 MG disintegrating tablet  Commonly known as: ZOFRAN-ODT   4 mg, Oral, Every 6 Hours PRN      oxyCODONE 5 MG immediate release tablet  Commonly known as: ROXICODONE   5 mg, Oral, Every 6 Hours PRN             Continue These Medications        Instructions Start Date   magnesium gluconate 500 MG tablet  Commonly known as: MAGONATE   27 mg, Daily      metoprolol succinate XL 25 MG 24 hr tablet  Commonly known as: TOPROL-XL   25 mg, Daily      multivitamin with minerals tablet tablet   1 tablet, Daily      omeprazole 40 MG capsule  Commonly known as: priLOSEC   40 mg, Daily      vitamin D3 125 MCG (5000 UT) capsule capsule   5,000 Units, 2 Times Daily               Allergies   Allergen Reactions    Penicillins Rash         Discharge Disposition:   Home or Self Care    Diet:  Hospital:  Diet Order   Procedures    Diet: Gastrointestinal; Fiber-Restricted, Low Irritant; Texture: Soft to Chew (NDD 3); Soft to Chew: Whole Meat; Fluid Consistency: Thin (IDDSI 0)         Discharge Activity:          CODE STATUS:  Code Status and Medical Interventions: CPR (Attempt to Resuscitate); Full Support   Ordered at: 12/15/24 1528     Level Of Support Discussed With:    Patient     Code Status (Patient has no pulse and is not breathing):    CPR (Attempt to Resuscitate)     Medical Interventions (Patient has pulse or is breathing):    Full Support         No future appointments.        Time spent on Discharge including face to face service:  45 minutes    Signature: Electronically signed by Parker Shah MD, 12/18/24, 12:29 EST.  [facility] Hospitalist Team      Electronically signed by Parker Shah MD at 12/18/24 1316

## 2024-12-19 NOTE — OUTREACH NOTE
Prep Survey      Flowsheet Row Responses   [facility]   Is LACE score < 7 ? No   Eligibility Readm Mgmt   Discharge diagnosis Acute pancreatitis   Does the patient have one of the following disease processes/diagnoses(primary or secondary)? Other   Does the patient have Home health ordered? No   Is there a DME ordered? No   Prep survey completed? Yes            GEORGIE HALL - Registered Nurse

## 2024-12-23 ENCOUNTER — READMISSION MANAGEMENT (OUTPATIENT)
Dept: CALL CENTER | Facility: HOSPITAL | Age: 49
End: 2024-12-23
Payer: COMMERCIAL

## 2024-12-24 NOTE — OUTREACH NOTE
Medical Week 1 Survey      Flowsheet Row Responses   [facility] [place] patient discharged from? [facility]   Does the patient have one of the following disease processes/diagnoses(primary or secondary)? Other   Week 1 attempt successful? Yes   Call start time 1954   Call end time 1957   Discharge diagnosis Acute pancreatitis   Meds reviewed with patient/caregiver? Yes   Is the patient having any side effects they believe may be caused by any medication additions or changes? No   Does the patient have all medications ordered at discharge? Yes   Is the patient taking all medications as directed (includes completed medication regime)? Yes   Does the patient have a primary care provider?  Yes   Does the patient have an appointment with their PCP within 7 days of discharge? Greater than 7 days   What is preventing the patient from scheduling follow up appointments within 7 days of discharge? Earlier appointment not available   Psychosocial issues? No   Did the patient receive a copy of their discharge instructions? Yes   Nursing interventions Reviewed instructions with patient   What is the patient's perception of their health status since discharge? Improving   Is the patient/caregiver able to teach back signs and symptoms related to disease process for when to call PCP? Yes   Is the patient/caregiver able to teach back signs and symptoms related to disease process for when to call 911? Yes   Is the patient/caregiver able to teach back the hierarchy of who to call/visit for symptoms/problems? PCP, Specialist, Home health nurse, Urgent Care, ED, 911 Yes   If the patient is a current smoker, are they able to teach back resources for cessation? Not a smoker   Additional teach back comments States he is doing well.  Trying to change his eating habits.  Has discontinue OTC herbal supplements.   Week 1 call completed? Yes   Graduated Yes   Graduated/Revoked comments Denies quesitons or needs at this time.   Call end time 1957             Jessica MULTANI - Licensed Nurse

## 2025-01-15 ENCOUNTER — OFFICE (AMBULATORY)
Dept: URBAN - METROPOLITAN AREA PATHOLOGY 19 | Facility: PATHOLOGY | Age: 50
End: 2025-01-15
Payer: COMMERCIAL

## 2025-01-15 ENCOUNTER — ON CAMPUS - OUTPATIENT (AMBULATORY)
Dept: URBAN - METROPOLITAN AREA HOSPITAL 2 | Facility: HOSPITAL | Age: 50
End: 2025-01-15
Payer: COMMERCIAL

## 2025-01-15 VITALS
DIASTOLIC BLOOD PRESSURE: 93 MMHG | SYSTOLIC BLOOD PRESSURE: 153 MMHG | SYSTOLIC BLOOD PRESSURE: 136 MMHG | SYSTOLIC BLOOD PRESSURE: 147 MMHG | HEART RATE: 75 BPM | RESPIRATION RATE: 18 BRPM | DIASTOLIC BLOOD PRESSURE: 86 MMHG | SYSTOLIC BLOOD PRESSURE: 137 MMHG | WEIGHT: 260 LBS | HEART RATE: 67 BPM | HEART RATE: 78 BPM | TEMPERATURE: 98 F | OXYGEN SATURATION: 16 % | DIASTOLIC BLOOD PRESSURE: 91 MMHG | HEIGHT: 70 IN | SYSTOLIC BLOOD PRESSURE: 133 MMHG | HEART RATE: 68 BPM | HEART RATE: 77 BPM | DIASTOLIC BLOOD PRESSURE: 90 MMHG | SYSTOLIC BLOOD PRESSURE: 127 MMHG | DIASTOLIC BLOOD PRESSURE: 988 MMHG | RESPIRATION RATE: 16 BRPM | HEART RATE: 74 BPM | OXYGEN SATURATION: 98 % | HEART RATE: 73 BPM | SYSTOLIC BLOOD PRESSURE: 149 MMHG | DIASTOLIC BLOOD PRESSURE: 78 MMHG

## 2025-01-15 DIAGNOSIS — Z12.11 ENCOUNTER FOR SCREENING FOR MALIGNANT NEOPLASM OF COLON: ICD-10-CM

## 2025-01-15 DIAGNOSIS — D12.2 BENIGN NEOPLASM OF ASCENDING COLON: ICD-10-CM

## 2025-01-15 DIAGNOSIS — D12.0 BENIGN NEOPLASM OF CECUM: ICD-10-CM

## 2025-01-15 DIAGNOSIS — K57.30 DIVERTICULOSIS OF LARGE INTESTINE WITHOUT PERFORATION OR ABS: ICD-10-CM

## 2025-01-15 DIAGNOSIS — D12.5 BENIGN NEOPLASM OF SIGMOID COLON: ICD-10-CM

## 2025-01-15 DIAGNOSIS — K22.2 ESOPHAGEAL OBSTRUCTION: ICD-10-CM

## 2025-01-15 DIAGNOSIS — K64.1 SECOND DEGREE HEMORRHOIDS: ICD-10-CM

## 2025-01-15 DIAGNOSIS — K44.9 DIAPHRAGMATIC HERNIA WITHOUT OBSTRUCTION OR GANGRENE: ICD-10-CM

## 2025-01-15 DIAGNOSIS — K21.9 GASTRO-ESOPHAGEAL REFLUX DISEASE WITHOUT ESOPHAGITIS: ICD-10-CM

## 2025-01-15 PROBLEM — K63.5 POLYP OF COLON: Status: ACTIVE | Noted: 2025-01-15

## 2025-01-15 LAB
GI HISTOLOGY: A. CECUM: (no result)
GI HISTOLOGY: B. ASCENDING COLON: (no result)
GI HISTOLOGY: C. SIGMOID COLON: (no result)
GI HISTOLOGY: PDF REPORT: (no result)

## 2025-01-15 PROCEDURE — 43450 DILATE ESOPHAGUS 1/MULT PASS: CPT | Performed by: INTERNAL MEDICINE

## 2025-01-15 PROCEDURE — 88305 TISSUE EXAM BY PATHOLOGIST: CPT | Performed by: PATHOLOGY

## 2025-01-15 PROCEDURE — 43235 EGD DIAGNOSTIC BRUSH WASH: CPT | Performed by: INTERNAL MEDICINE

## 2025-01-15 PROCEDURE — 45385 COLONOSCOPY W/LESION REMOVAL: CPT | Mod: 33 | Performed by: INTERNAL MEDICINE
